# Patient Record
Sex: MALE | Race: WHITE | NOT HISPANIC OR LATINO | Employment: OTHER | RURAL
[De-identification: names, ages, dates, MRNs, and addresses within clinical notes are randomized per-mention and may not be internally consistent; named-entity substitution may affect disease eponyms.]

---

## 2023-03-10 ENCOUNTER — OFFICE VISIT (OUTPATIENT)
Dept: FAMILY MEDICINE CLINIC | Facility: CLINIC | Age: 53
End: 2023-03-10
Payer: MEDICARE

## 2023-03-10 VITALS
HEART RATE: 104 BPM | TEMPERATURE: 98.2 F | OXYGEN SATURATION: 96 % | BODY MASS INDEX: 40.81 KG/M2 | HEIGHT: 67 IN | SYSTOLIC BLOOD PRESSURE: 162 MMHG | DIASTOLIC BLOOD PRESSURE: 104 MMHG | WEIGHT: 260 LBS

## 2023-03-10 DIAGNOSIS — G47.30 SLEEP APNEA, UNSPECIFIED TYPE: ICD-10-CM

## 2023-03-10 DIAGNOSIS — Z13.220 SCREENING FOR HYPERLIPIDEMIA: ICD-10-CM

## 2023-03-10 DIAGNOSIS — R10.11 RIGHT UPPER QUADRANT ABDOMINAL PAIN: ICD-10-CM

## 2023-03-10 DIAGNOSIS — F19.10 POLYSUBSTANCE ABUSE: ICD-10-CM

## 2023-03-10 DIAGNOSIS — E66.01 MORBID (SEVERE) OBESITY DUE TO EXCESS CALORIES: ICD-10-CM

## 2023-03-10 DIAGNOSIS — I10 HYPERTENSION, UNSPECIFIED TYPE: Primary | ICD-10-CM

## 2023-03-10 DIAGNOSIS — B19.20 HEPATITIS C VIRUS INFECTION WITHOUT HEPATIC COMA, UNSPECIFIED CHRONICITY: ICD-10-CM

## 2023-03-10 DIAGNOSIS — Z13.1 SCREENING FOR DIABETES MELLITUS (DM): ICD-10-CM

## 2023-03-10 PROBLEM — F11.988: Status: ACTIVE | Noted: 2023-03-10

## 2023-03-10 PROCEDURE — 36415 COLL VENOUS BLD VENIPUNCTURE: CPT | Performed by: INTERNAL MEDICINE

## 2023-03-10 PROCEDURE — 3077F SYST BP >= 140 MM HG: CPT | Performed by: INTERNAL MEDICINE

## 2023-03-10 PROCEDURE — 3080F DIAST BP >= 90 MM HG: CPT | Performed by: INTERNAL MEDICINE

## 2023-03-10 PROCEDURE — 99204 OFFICE O/P NEW MOD 45 MIN: CPT | Performed by: INTERNAL MEDICINE

## 2023-03-10 RX ORDER — LISINOPRIL 20 MG/1
20 TABLET ORAL DAILY
Qty: 30 TABLET | Refills: 5 | Status: SHIPPED | OUTPATIENT
Start: 2023-03-10

## 2023-03-10 NOTE — PROGRESS NOTES
Office Note     Name: Dev Ascencio    : 1970     MRN: [id number]     Chief Complaint  Establish Care (Pt is here to establish care today. ), Abdominal Pain (Pt was in the [place] ER for abdominal pain about a week ago. C/o right upper quad pain. ), Shortness of Breath (C/o SOB. ), and Hepatitis C (Pt would like to discuss treatment options for Hep C today. )    Subjective     History of Present Illness:  Dev Ascencio is a 52 y.o. male who presents today for multiple concerns    Patient had a recent ER visit, was having right upper quadrant pain, very severe, had recently been doing lifting,  No diarrhea or constipation, Did CT and labs and was diagnosed with Diverticulitis.  They started cipro and flagyl.  Still hurts to take a deep breath but is feeling some better.     Since that time has also had chest pain which is new.      Girlfriend concerned about him stopping breathing in his sleep and would like sleep apnea testing.    Also has dx of Hep C, was diagnosed in the , but tested positive again with the needle exchange program screenings. Has never undergone treatment.  Would  Like to get set up for Hep C Treatment.    Has history of heroine use.  Is not using as often because he ends up up wit h an overdose/ turning blue and requiring narcan. States he not longer uses IV drugs but is not able to specify his date of last use. He does state that he has access to narcan at home.  Is not currently interested in suboxone.          Review of Systems:   Review of Systems   Respiratory: Positive for shortness of breath.    Cardiovascular: Positive for leg swelling.       Past Medical History:   Past Medical History:   Diagnosis Date   • Arthritis    • Bone injury     BONE OR JOINT INJURIES   • Heart valve disorder    • Hepatitis C    • Hypertension    • Opioid-related disorder (HCC)    • Pulmonary embolism (HCC)     Patient states this is inaccurate       Past Surgical History:   Past  "Surgical History:   Procedure Laterality Date   • HIP SURGERY Left     X2   • JOINT REPLACEMENT     • ORTHOPEDIC SURGERY         Family History:   Family History   Problem Relation Age of Onset   • Diabetes Mother    • Allergies Mother    • Obesity Mother    • Other Mother         BLOOD DISEASE   • Eczema Mother    • Breast cancer Mother    • Alcohol abuse Father    • Stomach cancer Father    • Eczema Other    • Allergies Other    • Diabetes Other    • Obesity Other        Social History:   Social History     Socioeconomic History   • Marital status: Single   Tobacco Use   • Smoking status: Every Day     Packs/day: 1.00     Years: 40.00     Pack years: 40.00     Types: Cigarettes     Start date: 1983     Passive exposure: Current   • Smokeless tobacco: Never   Vaping Use   • Vaping Use: Every day   • Substances: Nicotine, Flavoring   Substance and Sexual Activity   • Alcohol use: Not Currently   • Drug use: Defer     Types: Heroin   • Sexual activity: Defer       Immunizations:   There is no immunization history on file for this patient.     Medications:   No current outpatient medications on file.    Allergies:   No Known Allergies    Objective     Vital Signs  BP (!) 162/104   Pulse 104   Temp 98.2 °F (36.8 °C)   Ht 170.2 cm (67\")   Wt 118 kg (260 lb)   SpO2 96%   BMI 40.72 kg/m²   Estimated body mass index is 40.72 kg/m² as calculated from the following:    Height as of this encounter: 170.2 cm (67\").    Weight as of this encounter: 118 kg (260 lb).    Class 3 Severe Obesity (BMI >=40). Obesity-related health conditions include the following: hypertension and GERD. Obesity is worsening. BMI is is above average; no BMI management plan is appropriate. We discussed Will defer to liver eval is complete.      Physical Exam  Vitals and nursing note reviewed.   Constitutional:       Appearance: Normal appearance.   Cardiovascular:      Rate and Rhythm: Normal rate and regular rhythm.      Heart sounds: No murmur " heard.    No friction rub. No gallop.   Pulmonary:      Effort: Pulmonary effort is normal.      Breath sounds: Normal breath sounds. No wheezing, rhonchi or rales.   Abdominal:      General: There is distension (rotund abdomen, difficult to palpate, no obvious mass or rebound tenderness).      Palpations: Abdomen is soft.      Tenderness: There is no abdominal tenderness. There is no guarding.   Neurological:      Mental Status: He is alert.            Procedures     Assessment and Plan     1. Hypertension, unspecified type  - Comprehensive Metabolic Panel; Future  - Comprehensive Metabolic Panel  - lisinopril (PRINIVIL,ZESTRIL) 20 MG tablet; Take 1 tablet by mouth Daily.  Dispense: 30 tablet; Refill: 5    2. Hepatitis C virus infection without hepatic coma, unspecified chronicity  If labs confirm positive hep C will refer to GI  - US Abdomen Complete; Future  - Comprehensive Metabolic Panel  - Hepatitis C Antibody  - Hepatitis C Virus (HCV) RNA, Diagnosis    3. Sleep apnea, unspecified type  - Comprehensive Metabolic Panel; Future  - Comprehensive Metabolic Panel    4. Right upper quadrant abdominal pain  - CBC Auto Differential; Future  - Comprehensive Metabolic Panel; Future  - US Abdomen Complete; Future  - CBC Auto Differential  - Comprehensive Metabolic Panel    5. Screening for diabetes mellitus (DM)  - Comprehensive Metabolic Panel; Future  - Comprehensive Metabolic Panel    6. Screening for hyperlipidemia  - Lipid Panel; Future  - Lipid Panel    7. Morbid (severe) obesity due to excess calories (HCC)  Will defer weight loss plan til liver/GI workup complete    8.Polysubstance abuse   Declines recommendation for suboxone clinic    Follow Up  Return in about 4 weeks (around 4/7/2023) for Followup with Dr Wilkins- IN PERSON.    Maddi Wilkins MD  [facility]  [address] [place] [id number] [phone]

## 2023-03-11 LAB
ALBUMIN SERPL-MCNC: 3.9 G/DL (ref 3.8–4.9)
ALBUMIN/GLOB SERPL: 1.1 {RATIO} (ref 1.2–2.2)
ALP SERPL-CCNC: 107 IU/L (ref 44–121)
ALT SERPL-CCNC: 17 IU/L (ref 0–44)
AST SERPL-CCNC: 19 IU/L (ref 0–40)
BASOPHILS # BLD AUTO: 0 X10E3/UL (ref 0–0.2)
BASOPHILS NFR BLD AUTO: 0 %
BILIRUB SERPL-MCNC: 0.3 MG/DL (ref 0–1.2)
BUN SERPL-MCNC: 15 MG/DL (ref 6–24)
BUN/CREAT SERPL: 14 (ref 9–20)
CALCIUM SERPL-MCNC: 9.1 MG/DL (ref 8.7–10.2)
CHLORIDE SERPL-SCNC: 102 MMOL/L (ref 96–106)
CHOLEST SERPL-MCNC: 162 MG/DL (ref 100–199)
CO2 SERPL-SCNC: 21 MMOL/L (ref 20–29)
CREAT SERPL-MCNC: 1.09 MG/DL (ref 0.76–1.27)
EGFRCR SERPLBLD CKD-EPI 2021: 81 ML/MIN/1.73
EOSINOPHIL # BLD AUTO: 0.2 X10E3/UL (ref 0–0.4)
EOSINOPHIL NFR BLD AUTO: 2 %
ERYTHROCYTE [DISTWIDTH] IN BLOOD BY AUTOMATED COUNT: 12.5 % (ref 11.6–15.4)
GLOBULIN SER CALC-MCNC: 3.5 G/DL (ref 1.5–4.5)
GLUCOSE SERPL-MCNC: 188 MG/DL (ref 70–99)
HCT VFR BLD AUTO: 44.7 % (ref 37.5–51)
HCV IGG SERPL QL IA: REACTIVE
HCV RNA SERPL NAA+PROBE-ACNC: NORMAL IU/ML
HCV RNA SERPL NAA+PROBE-LOG IU: 5.96 LOG10 IU/ML
HDLC SERPL-MCNC: 59 MG/DL
HGB BLD-MCNC: 15 G/DL (ref 13–17.7)
IMM GRANULOCYTES # BLD AUTO: 0.1 X10E3/UL (ref 0–0.1)
IMM GRANULOCYTES NFR BLD AUTO: 1 %
LDLC SERPL CALC-MCNC: 83 MG/DL (ref 0–99)
LYMPHOCYTES # BLD AUTO: 2.3 X10E3/UL (ref 0.7–3.1)
LYMPHOCYTES NFR BLD AUTO: 24 %
MCH RBC QN AUTO: 28.6 PG (ref 26.6–33)
MCHC RBC AUTO-ENTMCNC: 33.6 G/DL (ref 31.5–35.7)
MCV RBC AUTO: 85 FL (ref 79–97)
MONOCYTES # BLD AUTO: 0.5 X10E3/UL (ref 0.1–0.9)
MONOCYTES NFR BLD AUTO: 5 %
NEUTROPHILS # BLD AUTO: 6.3 X10E3/UL (ref 1.4–7)
NEUTROPHILS NFR BLD AUTO: 68 %
PLATELET # BLD AUTO: 323 X10E3/UL (ref 150–450)
POTASSIUM SERPL-SCNC: 4.6 MMOL/L (ref 3.5–5.2)
PROT SERPL-MCNC: 7.4 G/DL (ref 6–8.5)
RBC # BLD AUTO: 5.24 X10E6/UL (ref 4.14–5.8)
SODIUM SERPL-SCNC: 137 MMOL/L (ref 134–144)
TEST INFORMATION: NORMAL
TRIGL SERPL-MCNC: 109 MG/DL (ref 0–149)
VLDLC SERPL CALC-MCNC: 20 MG/DL (ref 5–40)
WBC # BLD AUTO: 9.4 X10E3/UL (ref 3.4–10.8)

## 2023-03-13 DIAGNOSIS — B19.20 HEPATITIS C VIRUS INFECTION WITHOUT HEPATIC COMA, UNSPECIFIED CHRONICITY: Primary | ICD-10-CM

## 2023-04-14 ENCOUNTER — HOSPITAL ENCOUNTER (OUTPATIENT)
Dept: ULTRASOUND IMAGING | Facility: HOSPITAL | Age: 53
Discharge: HOME | End: 2023-04-14
Admitting: INTERNAL MEDICINE
Payer: MEDICARE

## 2023-04-14 DIAGNOSIS — B19.20 HEPATITIS C VIRUS INFECTION WITHOUT HEPATIC COMA, UNSPECIFIED CHRONICITY: ICD-10-CM

## 2023-04-14 DIAGNOSIS — R10.11 RIGHT UPPER QUADRANT ABDOMINAL PAIN: ICD-10-CM

## 2023-04-14 PROCEDURE — 76700 US EXAM ABDOM COMPLETE: CPT

## 2023-04-17 ENCOUNTER — OFFICE VISIT (OUTPATIENT)
Dept: FAMILY MEDICINE CLINIC | Facility: CLINIC | Age: 53
End: 2023-04-17
Payer: MEDICARE

## 2023-04-17 VITALS
HEART RATE: 117 BPM | SYSTOLIC BLOOD PRESSURE: 144 MMHG | OXYGEN SATURATION: 96 % | DIASTOLIC BLOOD PRESSURE: 94 MMHG | TEMPERATURE: 98.4 F | BODY MASS INDEX: 36.99 KG/M2 | WEIGHT: 235.7 LBS | HEIGHT: 67 IN

## 2023-04-17 DIAGNOSIS — R73.9 ELEVATED BLOOD SUGAR: ICD-10-CM

## 2023-04-17 DIAGNOSIS — R63.4 WEIGHT LOSS: ICD-10-CM

## 2023-04-17 DIAGNOSIS — E11.65 TYPE 2 DIABETES MELLITUS WITH HYPERGLYCEMIA, WITHOUT LONG-TERM CURRENT USE OF INSULIN: Primary | ICD-10-CM

## 2023-04-17 DIAGNOSIS — R11.2 NAUSEA AND VOMITING, UNSPECIFIED VOMITING TYPE: ICD-10-CM

## 2023-04-17 LAB
EXPIRATION DATE: NORMAL
HBA1C MFR BLD: 14.1 %
Lab: NORMAL

## 2023-04-17 PROCEDURE — 36415 COLL VENOUS BLD VENIPUNCTURE: CPT | Performed by: INTERNAL MEDICINE

## 2023-04-17 RX ORDER — ONDANSETRON HYDROCHLORIDE 8 MG/1
8 TABLET, FILM COATED ORAL EVERY 8 HOURS PRN
Qty: 21 TABLET | Refills: 0 | Status: SHIPPED | OUTPATIENT
Start: 2023-04-17

## 2023-04-17 NOTE — PROGRESS NOTES
Office Note     Name: Dev Ascencio    : 1970     MRN: [id number]     Chief Complaint  Follow-up (4 week FU), Vomiting (Vommitting, diarrhea- started 4-5 days go (throws up everything he eat)/Insatiable thirst- since before the last appointment /Lightheaded and thirsty all the time/Then throws up all the water/Shortness of breath), and Weight Loss (Lost 25 lbs in the last month)    Subjective     History of Present Illness:  Dev Ascencio is a 52 y.o. male who presents today for persistent nausea, weight loss    patient has been feeling very thirsty, has had upset stomach and poor appetite, feeling more weak and tired. Has been vomting more and this week has started to have loose stool again.    Is more weak, dizzy and SOA      Review of Systems:   Review of Systems    Past Medical History:   Past Medical History:   Diagnosis Date   • Arthritis    • Bone injury     BONE OR JOINT INJURIES   • Heart valve disorder    • Hepatitis C    • Hypertension    • Opioid-related disorder    • Pulmonary embolism     Patient states this is inaccurate       Past Surgical History:   Past Surgical History:   Procedure Laterality Date   • HIP SURGERY Left     X2   • JOINT REPLACEMENT     • ORTHOPEDIC SURGERY         Family History:   Family History   Problem Relation Age of Onset   • Diabetes Mother    • Allergies Mother    • Obesity Mother    • Other Mother         BLOOD DISEASE   • Eczema Mother    • Breast cancer Mother    • Alcohol abuse Father    • Stomach cancer Father    • Eczema Other    • Allergies Other    • Diabetes Other    • Obesity Other        Social History:   Social History     Socioeconomic History   • Marital status: Significant Other   Tobacco Use   • Smoking status: Every Day     Packs/day: 0.25     Years: 40.00     Pack years: 10.00     Types: Cigarettes     Start date:      Passive exposure: Current   • Smokeless tobacco: Never   Vaping Use   • Vaping Use: Every day   • Substances:  "Nicotine, Flavoring   Substance and Sexual Activity   • Alcohol use: Not Currently   • Drug use: Defer     Types: Heroin   • Sexual activity: Defer       Immunizations:   There is no immunization history on file for this patient.     Medications:     Current Outpatient Medications:   •  lisinopril (PRINIVIL,ZESTRIL) 20 MG tablet, Take 1 tablet by mouth Daily., Disp: 30 tablet, Rfl: 5  •  metFORMIN (Glucophage) 500 MG tablet, Take 1 tablet by mouth 2 (Two) Times a Day With Meals., Disp: 60 tablet, Rfl: 1  •  ondansetron (Zofran) 8 MG tablet, Take 1 tablet by mouth Every 8 (Eight) Hours As Needed for Nausea or Vomiting., Disp: 21 tablet, Rfl: 0    Allergies:   No Known Allergies    Objective     Vital Signs  /94 (BP Location: Left arm)   Pulse 117   Temp 98.4 °F (36.9 °C) (Oral)   Ht 170.2 cm (67\")   Wt 107 kg (235 lb 11.2 oz)   SpO2 96%   BMI 36.92 kg/m²   Estimated body mass index is 36.92 kg/m² as calculated from the following:    Height as of this encounter: 170.2 cm (67\").    Weight as of this encounter: 107 kg (235 lb 11.2 oz).          Physical Exam  Vitals and nursing note reviewed.   Constitutional:       Appearance: Normal appearance.   Cardiovascular:      Rate and Rhythm: Normal rate and regular rhythm.      Heart sounds: No murmur heard.    No friction rub. No gallop.   Pulmonary:      Effort: Pulmonary effort is normal.      Breath sounds: Normal breath sounds. No wheezing, rhonchi or rales.   Neurological:      Mental Status: He is alert.         Procedures     Reviewed the following data during this visit:  US Abdomen Complete (04/14/2023 16:10)      Assessment and Plan     1. Type 2 diabetes mellitus with hyperglycemia, without long-term current use of insulin:     NEW DIAGOSIS  - POC Glycosylated Hemoglobin (Hb A1C)  - metFORMIN (Glucophage) 500 MG tablet; Take 1 tablet by mouth 2 (Two) Times a Day With Meals.  Dispense: 60 tablet; Refill: 1  Counseled that metformin may worsen naseua and " cause diarreha, if he is unable to tolerate PO medications and fluids he should be re-evaluated in ER    2. Elevated blood sugar    - Comprehensive Metabolic Panel; Future  - CBC Auto Differential; Future  - POC Glucose  - ondansetron (Zofran) 8 MG tablet; Take 1 tablet by mouth Every 8 (Eight) Hours As Needed for Nausea or Vomiting.  Dispense: 21 tablet; Refill: 0  - POC Urinalysis Dipstick, Automated  - POC Glycosylated Hemoglobin (Hb A1C)  - metFORMIN (Glucophage) 500 MG tablet; Take 1 tablet by mouth 2 (Two) Times a Day With Meals.  Dispense: 60 tablet; Refill: 1  - Comprehensive Metabolic Panel  - CBC Auto Differential  - CBC Auto Differential    3. Nausea and vomiting, unspecified vomiting type    - ondansetron (Zofran) 8 MG tablet; Take 1 tablet by mouth Every 8 (Eight) Hours As Needed for Nausea or Vomiting.  Dispense: 21 tablet; Refill: 0  - POC Urinalysis Dipstick, Automated  - POC Glycosylated Hemoglobin (Hb A1C)  - metFORMIN (Glucophage) 500 MG tablet; Take 1 tablet by mouth 2 (Two) Times a Day With Meals.  Dispense: 60 tablet; Refill: 1    4. Weight loss       Reviewed recent US from 4/14/23 with patient and wife in office today.    1. Nonvisualized gallbladder. No history of cholecystectomy is provided. The gallbladder may have been completely contracted at the time of imaging. Common bile duct is within normal limits  2. Hepatic steatosis  3. Normal ultrasound appearance of the kidneys, spleen, and visualized pancreas  4. Normal caliber aorta and patent IVC  5. No ascites        Follow Up  Return in about 2 weeks (around 5/1/2023) for Followup with Dr Wilkins- IN PERSON.    Patient was given instructions and counseling regarding his condition or for health maintenance advice. Please see specific information pulled into the AVS if appropriate.     I spent 32 minutes caring for this patient on this date of service. This time includes time spent by me in the following activities: preparing for the visit,  reviewing tests, obtaining and/or reviewing a separately obtained history, counseling and educating the patient/family/caregiver and documenting information in the medical record.    [facility]  [address] [place] [id number] [phone]  Answers for HPI/ROS submitted by the patient on 4/13/2023  What is the primary reason for your visit?: Abdominal Pain  Chronicity: recurrent  Onset: more than 1 month ago  Onset quality: sudden  Frequency: daily  Progression since onset: waxing and waning  Pain location: LUQ  Pain - numeric: 6/10  Pain quality: cramping  anorexia: No  belching: Yes  flatus: Yes  headaches: No  hematochezia: No  melena: No  Aggravated by: coughing, deep breathing, eating, vomiting  Relieved by: being still, recumbency  Diagnostic workup: ultrasound

## 2023-04-19 LAB
ALBUMIN SERPL-MCNC: 4.6 G/DL (ref 3.8–4.9)
ALBUMIN/GLOB SERPL: 1.2 {RATIO} (ref 1.2–2.2)
ALP SERPL-CCNC: 145 IU/L (ref 44–121)
ALT SERPL-CCNC: 24 IU/L (ref 0–44)
AST SERPL-CCNC: 23 IU/L (ref 0–40)
BASOPHILS # BLD AUTO: 0.1 X10E3/UL (ref 0–0.2)
BASOPHILS NFR BLD AUTO: 1 %
BILIRUB SERPL-MCNC: 1 MG/DL (ref 0–1.2)
BUN SERPL-MCNC: 14 MG/DL (ref 6–24)
BUN/CREAT SERPL: 13 (ref 9–20)
CALCIUM SERPL-MCNC: 9.9 MG/DL (ref 8.7–10.2)
CHLORIDE SERPL-SCNC: 85 MMOL/L (ref 96–106)
CO2 SERPL-SCNC: 21 MMOL/L (ref 20–29)
CREAT SERPL-MCNC: 1.09 MG/DL (ref 0.76–1.27)
EGFRCR SERPLBLD CKD-EPI 2021: 81 ML/MIN/1.73
EOSINOPHIL # BLD AUTO: 0.1 X10E3/UL (ref 0–0.4)
EOSINOPHIL NFR BLD AUTO: 1 %
ERYTHROCYTE [DISTWIDTH] IN BLOOD BY AUTOMATED COUNT: 12.6 % (ref 11.6–15.4)
GLOBULIN SER CALC-MCNC: 3.7 G/DL (ref 1.5–4.5)
GLUCOSE SERPL-MCNC: 563 MG/DL (ref 70–99)
HCT VFR BLD AUTO: 52.3 % (ref 37.5–51)
HGB BLD-MCNC: 17.2 G/DL (ref 13–17.7)
IMM GRANULOCYTES # BLD AUTO: 0 X10E3/UL (ref 0–0.1)
IMM GRANULOCYTES NFR BLD AUTO: 0 %
LYMPHOCYTES # BLD AUTO: 3 X10E3/UL (ref 0.7–3.1)
LYMPHOCYTES NFR BLD AUTO: 28 %
MCH RBC QN AUTO: 28.4 PG (ref 26.6–33)
MCHC RBC AUTO-ENTMCNC: 32.9 G/DL (ref 31.5–35.7)
MCV RBC AUTO: 86 FL (ref 79–97)
MONOCYTES # BLD AUTO: 0.6 X10E3/UL (ref 0.1–0.9)
MONOCYTES NFR BLD AUTO: 6 %
NEUTROPHILS # BLD AUTO: 6.8 X10E3/UL (ref 1.4–7)
NEUTROPHILS NFR BLD AUTO: 64 %
PLATELET # BLD AUTO: 339 X10E3/UL (ref 150–450)
POTASSIUM SERPL-SCNC: 7.1 MMOL/L (ref 3.5–5.2)
PROT SERPL-MCNC: 8.3 G/DL (ref 6–8.5)
RBC # BLD AUTO: 6.05 X10E6/UL (ref 4.14–5.8)
SODIUM SERPL-SCNC: 129 MMOL/L (ref 134–144)
WBC # BLD AUTO: 10.6 X10E3/UL (ref 3.4–10.8)

## 2023-04-21 ENCOUNTER — OFFICE VISIT (OUTPATIENT)
Dept: FAMILY MEDICINE CLINIC | Facility: CLINIC | Age: 53
End: 2023-04-21
Payer: MEDICARE

## 2023-04-21 VITALS
SYSTOLIC BLOOD PRESSURE: 138 MMHG | BODY MASS INDEX: 37.9 KG/M2 | OXYGEN SATURATION: 95 % | WEIGHT: 241.5 LBS | HEART RATE: 105 BPM | DIASTOLIC BLOOD PRESSURE: 92 MMHG | HEIGHT: 67 IN

## 2023-04-21 DIAGNOSIS — Z79.4 TYPE 2 DIABETES MELLITUS WITH HYPERGLYCEMIA, WITH LONG-TERM CURRENT USE OF INSULIN: Primary | ICD-10-CM

## 2023-04-21 DIAGNOSIS — E11.65 TYPE 2 DIABETES MELLITUS WITH HYPERGLYCEMIA, WITH LONG-TERM CURRENT USE OF INSULIN: Primary | ICD-10-CM

## 2023-04-21 LAB — GLUCOSE BLDC GLUCOMTR-MCNC: 326 MG/DL (ref 70–130)

## 2023-04-21 RX ORDER — PEN NEEDLE, DIABETIC 30 GX5/16"
NEEDLE, DISPOSABLE MISCELLANEOUS
Qty: 30 EACH | Refills: 11 | Status: SHIPPED | OUTPATIENT
Start: 2023-04-21

## 2023-04-21 RX ORDER — METOCLOPRAMIDE 10 MG/1
10 TABLET ORAL 3 TIMES WEEKLY
COMMUNITY
Start: 2023-04-19

## 2023-04-21 NOTE — PROGRESS NOTES
Office Note     Name: Dev Ascencio    : 1970     MRN: [id number]     Chief Complaint  Diabetes and Hospital Follow Up Visit (Pt is here for a HFU today. )    Subjective     History of Present Illness:  Dev Ascencio is a 52 y.o. male who presents today for hospital followup.  He was a new diagnosis of diabetes at last visit, his glucose had gone up significantly and his A1C at that time was elevated, We'd attempted to start him on metformin but he had persistent nausea/vomiting so he went to ER. They gave him IVF and a one time dose of insulin and sent him home.    Today he feels some better but still very fatigued and nauseated.    Review of Systems:   Review of Systems    Past Medical History:   Past Medical History:   Diagnosis Date   • Arthritis    • Bone injury     BONE OR JOINT INJURIES   • Heart valve disorder    • Hepatitis C    • Hypertension    • Opioid-related disorder    • Pulmonary embolism     Patient states this is inaccurate       Past Surgical History:   Past Surgical History:   Procedure Laterality Date   • HIP SURGERY Left     X2   • JOINT REPLACEMENT     • ORTHOPEDIC SURGERY         Family History:   Family History   Problem Relation Age of Onset   • Diabetes Mother    • Allergies Mother    • Obesity Mother    • Other Mother         BLOOD DISEASE   • Eczema Mother    • Breast cancer Mother    • Alcohol abuse Father    • Stomach cancer Father    • Eczema Other    • Allergies Other    • Diabetes Other    • Obesity Other        Social History:   Social History     Socioeconomic History   • Marital status: Significant Other   Tobacco Use   • Smoking status: Every Day     Packs/day: 0.25     Years: 40.00     Pack years: 10.00     Types: Cigarettes     Start date:      Passive exposure: Current   • Smokeless tobacco: Never   Vaping Use   • Vaping Use: Every day   • Substances: Nicotine, Flavoring   Substance and Sexual Activity   • Alcohol use: Not Currently   • Drug use:  "Defer     Types: Heroin   • Sexual activity: Defer       Immunizations:   There is no immunization history on file for this patient.     Medications:     Current Outpatient Medications:   •  lisinopril (PRINIVIL,ZESTRIL) 20 MG tablet, Take 1 tablet by mouth Daily., Disp: 30 tablet, Rfl: 5  •  metFORMIN (Glucophage) 500 MG tablet, Take 1 tablet by mouth 2 (Two) Times a Day With Meals., Disp: 60 tablet, Rfl: 1  •  metoclopramide (REGLAN) 10 MG tablet, Take 1 tablet by mouth 3 (Three) Times a Week., Disp: , Rfl:   •  ondansetron (Zofran) 8 MG tablet, Take 1 tablet by mouth Every 8 (Eight) Hours As Needed for Nausea or Vomiting., Disp: 21 tablet, Rfl: 0  •  Insulin Glargine (LANTUS SOLOSTAR) 100 UNIT/ML injection pen, Inject 10 Units under the skin into the appropriate area as directed Every Night., Disp: 3 mL, Rfl: 1  •  Insulin Pen Needle (Pen Needles 3/16\") 31G X 5 MM misc, Use as directed with insulin Pen, Disp: 30 each, Rfl: 11    Allergies:   No Known Allergies    Objective     Vital Signs  /92   Pulse 105   Ht 170.2 cm (67\")   Wt 110 kg (241 lb 8 oz)   SpO2 95%   BMI 37.82 kg/m²   Estimated body mass index is 37.82 kg/m² as calculated from the following:    Height as of this encounter: 170.2 cm (67\").    Weight as of this encounter: 110 kg (241 lb 8 oz).          Physical Exam  Vitals and nursing note reviewed.   Constitutional:       Appearance: Normal appearance.   Cardiovascular:      Rate and Rhythm: Normal rate and regular rhythm.      Heart sounds: No murmur heard.    No friction rub. No gallop.   Pulmonary:      Effort: Pulmonary effort is normal.      Breath sounds: Normal breath sounds. No wheezing, rhonchi or rales.   Neurological:      Mental Status: He is alert.            Procedures     Assessment and Plan     1. Type 2 diabetes mellitus with hyperglycemia, with long-term current use of insulin    - POC Glucose  - Insulin Glargine (LANTUS SOLOSTAR) 100 UNIT/ML injection pen; Inject 10 " "Units under the skin into the appropriate area as directed Every Night.  Dispense: 3 mL; Refill: 1  - Comprehensive Metabolic Panel; Future  - Magnesium; Future  - Miscellaneous DME  - Insulin Pen Needle (Pen Needles 3/16\") 31G X 5 MM misc; Use as directed with insulin Pen  Dispense: 30 each; Refill: 11       Follow Up  No follow-ups on file.    Patient was given instructions and counseling regarding his condition or for health maintenance advice. Please see specific information pulled into the AVS if appropriate.     Maddi Wilkins MD  [facility]  [address] [place] [id number] [phone]  "

## 2023-05-03 ENCOUNTER — OFFICE VISIT (OUTPATIENT)
Dept: FAMILY MEDICINE CLINIC | Facility: CLINIC | Age: 53
End: 2023-05-03
Payer: MEDICARE

## 2023-05-03 ENCOUNTER — TELEPHONE (OUTPATIENT)
Dept: FAMILY MEDICINE CLINIC | Facility: CLINIC | Age: 53
End: 2023-05-03

## 2023-05-03 VITALS
HEIGHT: 67 IN | SYSTOLIC BLOOD PRESSURE: 160 MMHG | HEART RATE: 121 BPM | DIASTOLIC BLOOD PRESSURE: 98 MMHG | WEIGHT: 239 LBS | OXYGEN SATURATION: 93 % | BODY MASS INDEX: 37.51 KG/M2

## 2023-05-03 DIAGNOSIS — E11.65 TYPE 2 DIABETES MELLITUS WITH HYPERGLYCEMIA, WITH LONG-TERM CURRENT USE OF INSULIN: ICD-10-CM

## 2023-05-03 DIAGNOSIS — R73.9 ELEVATED BLOOD SUGAR: ICD-10-CM

## 2023-05-03 DIAGNOSIS — R11.2 NAUSEA AND VOMITING, UNSPECIFIED VOMITING TYPE: ICD-10-CM

## 2023-05-03 DIAGNOSIS — Z79.4 TYPE 2 DIABETES MELLITUS WITH HYPERGLYCEMIA, WITH LONG-TERM CURRENT USE OF INSULIN: ICD-10-CM

## 2023-05-03 DIAGNOSIS — M62.838 MUSCLE SPASM: Primary | ICD-10-CM

## 2023-05-03 DIAGNOSIS — E11.65 TYPE 2 DIABETES MELLITUS WITH HYPERGLYCEMIA, WITHOUT LONG-TERM CURRENT USE OF INSULIN: ICD-10-CM

## 2023-05-03 PROCEDURE — 3080F DIAST BP >= 90 MM HG: CPT | Performed by: INTERNAL MEDICINE

## 2023-05-03 PROCEDURE — 1159F MED LIST DOCD IN RCRD: CPT | Performed by: INTERNAL MEDICINE

## 2023-05-03 PROCEDURE — 1160F RVW MEDS BY RX/DR IN RCRD: CPT | Performed by: INTERNAL MEDICINE

## 2023-05-03 PROCEDURE — 3077F SYST BP >= 140 MM HG: CPT | Performed by: INTERNAL MEDICINE

## 2023-05-03 PROCEDURE — 3046F HEMOGLOBIN A1C LEVEL >9.0%: CPT | Performed by: INTERNAL MEDICINE

## 2023-05-03 PROCEDURE — 99213 OFFICE O/P EST LOW 20 MIN: CPT | Performed by: INTERNAL MEDICINE

## 2023-05-03 NOTE — PROGRESS NOTES
Office Note     Name: Dev Ascencio    : 1970     MRN: [id number]     Chief Complaint  Diabetes (Pt is here for a follow up on dm. )    Subjective     History of Present Illness:  Dev Ascencio is a 52 y.o. male who presents today for followup on DM    DM: Fasting glucoses still >300. Taking 1 metformin per day usually in the morning, can't take 2 due to GI issues and schedule not allowing him to take the evening dose in the evening, and usually takes 10 units of insulin before bed. Is not interested in changing metformin to a longer acting formulation    States he usually never misses doses but [place] and forgot he had the appointment til this morning.    Has not been checking his sugars as regularly as he'd like  because pharmacy told him his insurance wouldn't cover the glucometer but would not tell him why. Has been using mom's glucometer    Has also been having severe  Muscle spasms in legs.    Review of Systems:   Review of Systems    Past Medical History:   Past Medical History:   Diagnosis Date   • Arthritis    • Bone injury     BONE OR JOINT INJURIES   • Heart valve disorder    • Hepatitis C    • Hypertension    • Opioid-related disorder    • Pulmonary embolism     Patient states this is inaccurate       Past Surgical History:   Past Surgical History:   Procedure Laterality Date   • HIP SURGERY Left     X2   • JOINT REPLACEMENT     • ORTHOPEDIC SURGERY         Family History:   Family History   Problem Relation Age of Onset   • Diabetes Mother    • Allergies Mother    • Obesity Mother    • Other Mother         BLOOD DISEASE   • Eczema Mother    • Breast cancer Mother    • Alcohol abuse Father    • Stomach cancer Father    • Eczema Other    • Allergies Other    • Diabetes Other    • Obesity Other        Social History:   Social History     Socioeconomic History   • Marital status: Significant Other   Tobacco Use   • Smoking status: Every Day      "Packs/day: 0.25     Years: 40.00     Pack years: 10.00     Types: Cigarettes     Start date: 1983     Passive exposure: Current   • Smokeless tobacco: Never   Vaping Use   • Vaping Use: Every day   • Substances: Nicotine, Flavoring   Substance and Sexual Activity   • Alcohol use: Not Currently   • Drug use: Defer     Types: Heroin   • Sexual activity: Defer       Immunizations:   There is no immunization history on file for this patient.     Medications:     Current Outpatient Medications:   •  Insulin Glargine (LANTUS SOLOSTAR) 100 UNIT/ML injection pen, Inject 10 Units under the skin into the appropriate area as directed Every Night., Disp: 3 mL, Rfl: 1  •  Insulin Pen Needle (Pen Needles 3/16\") 31G X 5 MM misc, Use as directed with insulin Pen, Disp: 30 each, Rfl: 11  •  lisinopril (PRINIVIL,ZESTRIL) 20 MG tablet, Take 1 tablet by mouth Daily., Disp: 30 tablet, Rfl: 5  •  metFORMIN (Glucophage) 500 MG tablet, Take 1 tablet by mouth 2 (Two) Times a Day With Meals., Disp: 60 tablet, Rfl: 1  •  metoclopramide (REGLAN) 10 MG tablet, Take 1 tablet by mouth 3 (Three) Times a Week., Disp: , Rfl:   •  ondansetron (Zofran) 8 MG tablet, Take 1 tablet by mouth Every 8 (Eight) Hours As Needed for Nausea or Vomiting., Disp: 21 tablet, Rfl: 0    Allergies:   No Known Allergies    Objective     Vital Signs  /98   Pulse (!) 121   Ht 170.2 cm (67\")   Wt 108 kg (239 lb)   SpO2 93%   BMI 37.43 kg/m²   Estimated body mass index is 37.43 kg/m² as calculated from the following:    Height as of this encounter: 170.2 cm (67\").    Weight as of this encounter: 108 kg (239 lb).          Physical Exam  Vitals and nursing note reviewed.   Constitutional:       Appearance: Normal appearance.   Cardiovascular:      Rate and Rhythm: Normal rate and regular rhythm.      Heart sounds: No murmur heard.    No friction rub. No gallop.   Pulmonary:      Effort: Pulmonary effort is normal.      Breath sounds: Normal breath sounds. No " wheezing, rhonchi or rales.   Neurological:      Mental Status: He is alert.            Procedures     Assessment and Plan     1. Type 2 diabetes mellitus with hyperglycemia, with long-term current use of insulin  INCREASE Lantus to 15 units, for 2 weeks, then if sugar is no better go up to 20 units at bedtime.  - Insulin Glargine (LANTUS SOLOSTAR) 100 UNIT/ML injection pen; Inject 20 Units under the skin into the appropriate area as directed Every Night.  Dispense: 15 mL; Refill: 1    2. Elevated blood sugar  - metFORMIN (Glucophage) 500 MG tablet; Take 1 tablet by mouth 2 (Two) Times a Day With Meals.  Dispense: 60 tablet; Refill: 1    3. Nausea and vomiting, unspecified vomiting type  - metFORMIN (Glucophage) 500 MG tablet; Take 1 tablet by mouth 2 (Two) Times a Day With Meals.  Dispense: 60 tablet; Refill: 1    4. Type 2 diabetes mellitus with hyperglycemia, without long-term current use of insulin  - metFORMIN (Glucophage) 500 MG tablet; Take 1 tablet by mouth 2 (Two) Times a Day With Meals.  Dispense: 60 tablet; Refill: 1    5. Muscle spasm    - Basic Metabolic Panel  - Magnesium  - CK       Follow Up  Return in about 2 months (around 7/3/2023) for Followup with Dr Wilkins- IN PERSON.    Patient was given instructions and counseling regarding his condition or for health maintenance advice. Please see specific information pulled into the AVS if appropriate.     Maddi Wilkins MD  [facility]  [address] [place] [id number] [phone]

## 2023-05-04 LAB
BUN SERPL-MCNC: 15 MG/DL (ref 6–24)
BUN/CREAT SERPL: 11 (ref 9–20)
CALCIUM SERPL-MCNC: 9.7 MG/DL (ref 8.7–10.2)
CHLORIDE SERPL-SCNC: 93 MMOL/L (ref 96–106)
CK SERPL-CCNC: 47 U/L (ref 41–331)
CO2 SERPL-SCNC: 17 MMOL/L (ref 20–29)
CREAT SERPL-MCNC: 1.33 MG/DL (ref 0.76–1.27)
EGFRCR SERPLBLD CKD-EPI 2021: 64 ML/MIN/1.73
GLUCOSE SERPL-MCNC: 803 MG/DL (ref 70–99)
POTASSIUM SERPL-SCNC: 4.6 MMOL/L (ref 3.5–5.2)
SODIUM SERPL-SCNC: 130 MMOL/L (ref 134–144)

## 2023-08-24 DIAGNOSIS — R11.2 NAUSEA AND VOMITING, UNSPECIFIED VOMITING TYPE: ICD-10-CM

## 2023-08-24 DIAGNOSIS — R73.9 ELEVATED BLOOD SUGAR: ICD-10-CM

## 2023-08-28 RX ORDER — BLOOD-GLUCOSE METER
EACH MISCELLANEOUS
Qty: 1 EACH | Refills: 1 | Status: SHIPPED | OUTPATIENT
Start: 2023-08-28

## 2023-08-28 RX ORDER — ONDANSETRON HYDROCHLORIDE 8 MG/1
TABLET, FILM COATED ORAL
Qty: 21 TABLET | Refills: 0 | Status: SHIPPED | OUTPATIENT
Start: 2023-08-28

## 2023-09-28 ENCOUNTER — EXTERNAL PBMM DATA (OUTPATIENT)
Dept: PHARMACY | Facility: OTHER | Age: 53
End: 2023-09-28
Payer: MEDICARE

## 2023-10-11 DIAGNOSIS — E11.65 TYPE 2 DIABETES MELLITUS WITH HYPERGLYCEMIA, WITH LONG-TERM CURRENT USE OF INSULIN: ICD-10-CM

## 2023-10-11 DIAGNOSIS — I10 HYPERTENSION, UNSPECIFIED TYPE: ICD-10-CM

## 2023-10-11 DIAGNOSIS — Z79.4 TYPE 2 DIABETES MELLITUS WITH HYPERGLYCEMIA, WITH LONG-TERM CURRENT USE OF INSULIN: ICD-10-CM

## 2023-10-17 RX ORDER — LISINOPRIL 20 MG/1
20 TABLET ORAL DAILY
Qty: 90 TABLET | Refills: 0 | Status: SHIPPED | OUTPATIENT
Start: 2023-10-17

## 2023-10-17 RX ORDER — INSULIN GLARGINE 100 [IU]/ML
INJECTION, SOLUTION SUBCUTANEOUS
Qty: 15 ML | Refills: 1 | Status: SHIPPED | OUTPATIENT
Start: 2023-10-17

## 2023-10-24 ENCOUNTER — OFFICE VISIT (OUTPATIENT)
Dept: FAMILY MEDICINE CLINIC | Facility: CLINIC | Age: 53
End: 2023-10-24
Payer: MEDICARE

## 2023-10-24 VITALS
OXYGEN SATURATION: 96 % | SYSTOLIC BLOOD PRESSURE: 184 MMHG | DIASTOLIC BLOOD PRESSURE: 110 MMHG | HEART RATE: 94 BPM | BODY MASS INDEX: 40.39 KG/M2 | WEIGHT: 257.9 LBS

## 2023-10-24 DIAGNOSIS — I10 HYPERTENSION, UNSPECIFIED TYPE: ICD-10-CM

## 2023-10-24 DIAGNOSIS — L30.9 DERMATITIS: ICD-10-CM

## 2023-10-24 DIAGNOSIS — Z00.00 MEDICARE ANNUAL WELLNESS VISIT, SUBSEQUENT: Primary | ICD-10-CM

## 2023-10-24 DIAGNOSIS — R60.0 LOWER EXTREMITY EDEMA: ICD-10-CM

## 2023-10-24 DIAGNOSIS — R11.2 NAUSEA AND VOMITING, UNSPECIFIED VOMITING TYPE: ICD-10-CM

## 2023-10-24 DIAGNOSIS — Z79.4 TYPE 2 DIABETES MELLITUS WITH HYPERGLYCEMIA, WITH LONG-TERM CURRENT USE OF INSULIN: ICD-10-CM

## 2023-10-24 DIAGNOSIS — E11.65 TYPE 2 DIABETES MELLITUS WITH HYPERGLYCEMIA, WITH LONG-TERM CURRENT USE OF INSULIN: ICD-10-CM

## 2023-10-24 DIAGNOSIS — I10 POORLY-CONTROLLED HYPERTENSION: ICD-10-CM

## 2023-10-24 DIAGNOSIS — R06.02 SHORTNESS OF BREATH: ICD-10-CM

## 2023-10-24 LAB
EXPIRATION DATE: ABNORMAL
HBA1C MFR BLD: 7.4 % (ref 4.5–5.7)
Lab: ABNORMAL

## 2023-10-24 RX ORDER — LISINOPRIL 40 MG/1
40 TABLET ORAL DAILY
Qty: 90 TABLET | Refills: 1 | Status: SHIPPED | OUTPATIENT
Start: 2023-10-24

## 2023-10-24 RX ORDER — INSULIN GLARGINE 100 [IU]/ML
20 INJECTION, SOLUTION SUBCUTANEOUS DAILY
Qty: 15 ML | Refills: 6 | Status: SHIPPED | OUTPATIENT
Start: 2023-10-24

## 2023-10-24 NOTE — PROGRESS NOTES
"The ABCs of the Annual Wellness Visit  Subsequent Medicare Wellness Visit    Subjective    Dev Ascencio is a 53 y.o. male who presents for a Subsequent Medicare Wellness Visit.    The following portions of the patient's history were reviewed and   updated as appropriate: allergies, current medications, past family history, past medical history, past social history, past surgical history, and problem list.    Compared to one year ago, the patient feels his physical   health is better.    Compared to one year ago, the patient feels his mental   health is the same.    Recent Hospitalizations:  He was NOT admitted within the past 365 days at  hospital.       Current Medical Providers:  Patient Care Team:  Maddi Wilkins MD as PCP - General (Internal Medicine & Pediatrics)    Outpatient Medications Prior to Visit   Medication Sig Dispense Refill    Blood Glucose Monitoring Suppl (ONE TOUCH ULTRA 2) w/Device kit USE TO TEST BLOOD SUGAR 1 each 1    Insulin Pen Needle (Pen Needles 3/16\") 31G X 5 MM misc Use as directed with insulin Pen 30 each 11    Lantus SoloStar 100 UNIT/ML injection pen INJECT 20 UNITS UNDER THE SKIN INTO THE APPROPRIATE AREA AS DIRECTED EVERY NIGHT 15 mL 1    lisinopril (PRINIVIL,ZESTRIL) 20 MG tablet TAKE ONE TABLET BY MOUTH DAILY 90 tablet 0    metFORMIN (Glucophage) 500 MG tablet Take 1 tablet by mouth 2 (Two) Times a Day With Meals. 60 tablet 1    metoclopramide (REGLAN) 10 MG tablet Take 1 tablet by mouth 3 (Three) Times a Week. (Patient not taking: Reported on 10/24/2023)      ondansetron (ZOFRAN) 8 MG tablet TAKE ONE TABLET BY MOUTH EVERY 8 HOURS AS NEEDED FOR NAUSEA OR VOMITING (Patient not taking: Reported on 10/24/2023) 21 tablet 0     No facility-administered medications prior to visit.       No opioid medication identified on active medication list. I have reviewed chart for other potential  high risk medication/s and harmful drug interactions in the elderly.        Aspirin is not on " "active medication list.  Aspirin use is not indicated based on review of current medical condition/s. Risk of harm outweighs potential benefits.  .    Patient Active Problem List   Diagnosis    Hepatitis C    Hypertension    History of endocarditis    Opioid-related disorder     Advance Care Planning   Advance Care Planning     Advance Directive is not on file.  ACP discussion was held with the patient during this visit. Patient does not have an advance directive, declines further assistance.     Objective    Vitals:    10/24/23 0944   BP: (!) 184/110   Pulse: 94   SpO2: 96%   Weight: 117 kg (257 lb 14.4 oz)     Estimated body mass index is 40.39 kg/m² as calculated from the following:    Height as of 5/3/23: 170.2 cm (67\").    Weight as of this encounter: 117 kg (257 lb 14.4 oz).           Does the patient have evidence of cognitive impairment? No    Lab Results   Component Value Date    HGBA1C 7.4 (A) 10/24/2023        HEALTH RISK ASSESSMENT    Smoking Status:  Social History     Tobacco Use   Smoking Status Every Day    Packs/day: 0.25    Years: 40.00    Additional pack years: 0.00    Total pack years: 10.00    Types: Cigarettes    Start date: 1983    Passive exposure: Current   Smokeless Tobacco Never     Alcohol Consumption:  Social History     Substance and Sexual Activity   Alcohol Use Not Currently     Fall Risk Screen:    MARIA LUZ Fall Risk Assessment was completed, and patient is at HIGH risk for falls. Assessment completed on:10/24/2023    Depression Screening:      10/24/2023     9:46 AM   PHQ-2/PHQ-9 Depression Screening   Little Interest or Pleasure in Doing Things 0-->not at all   Feeling Down, Depressed or Hopeless 0-->not at all   PHQ-9: Brief Depression Severity Measure Score 0       Health Habits and Functional and Cognitive Screening:      10/24/2023     9:46 AM   Functional & Cognitive Status   Do you have difficulty preparing food and eating? No   Do you have difficulty bathing yourself, getting " dressed or grooming yourself? No   Do you have difficulty using the toilet? No   Do you have difficulty moving around from place to place? Yes   Do you have trouble with steps or getting out of a bed or a chair? Yes   Current Diet Frequent Junk Food   Dental Exam Up to date   Eye Exam Not up to date   Exercise (times per week) 0 times per week   Current Exercises Include No Regular Exercise   Do you need help using the phone?  No   Are you deaf or do you have serious difficulty hearing?  No   Do you need help to go to places out of walking distance? Yes   Do you need help shopping? Yes   Do you need help preparing meals?  No   Do you need help with housework?  No   Do you need help with laundry? No   Do you need help taking your medications? No   Do you need help managing money? No   Do you ever drive or ride in a car without wearing a seat belt? No   Have you felt unusual stress, anger or loneliness in the last month? Yes   Who do you live with? Spouse   If you need help, do you have trouble finding someone available to you? No   Have you been bothered in the last four weeks by sexual problems? No   Do you have difficulty concentrating, remembering or making decisions? Yes       Age-appropriate Screening Schedule:  Refer to the list below for future screening recommendations based on patient's age, sex and/or medical conditions. Orders for these recommended tests are listed in the plan section. The patient has been provided with a written plan.    Health Maintenance   Topic Date Due    Hepatitis B (1 of 3 - 3-dose series) Never done    URINE MICROALBUMIN  Never done    COLORECTAL CANCER SCREENING  Never done    Pneumococcal Vaccine 0-64 (1 - PCV) Never done    TDAP/TD VACCINES (1 - Tdap) Never done    ZOSTER VACCINE (1 of 2) Never done    ANNUAL WELLNESS VISIT  Never done    DIABETIC FOOT EXAM  Never done    DIABETIC EYE EXAM  Never done    COVID-19 Vaccine (2 - 2023-24 season) 10/26/2023 (Originally 9/1/2023)     INFLUENZA VACCINE  03/31/2024 (Originally 8/1/2023)    BMI FOLLOWUP  03/10/2024    HEMOGLOBIN A1C  04/24/2024    HEPATITIS C SCREENING  Completed                  CMS Preventative Services Quick Reference  Risk Factors Identified During Encounter  Vision Screening Recommended  The above risks/problems have been discussed with the patient.  Pertinent information has been shared with the patient in the After Visit Summary.  An After Visit Summary and PPPS were made available to the patient.    Follow Up:   Next Medicare Wellness visit to be scheduled in 1 year.       Additional E&M Note during same encounter follows:  Patient has multiple medical problems which are significant and separately identifiable that require additional work above and beyond the Medicare Wellness Visit.      Chief Complaint  Medicare Wellness-subsequent (Pt is here for a medicare wellness exam today. )    Subjective        HPI  Dev Ascencio is also being seen today for followup  Diabetes: Patient is currently taking all medications as directed.  Patient is currently checking glucoses and they are as follows 200.  Patient denies any nonhealing skin wounds or ulcers.  Patient denies any changes in vision.  Patient's last diabetic eye exam was on earlier this year.  Is due to go back     Has also been struggling with SOA  especially when laying flat but also happens with walking. Cannot walk far due to left hip OA and prior injury. No chest pain but does get SOA        Objective   Vital Signs:  BP (!) 184/110   Pulse 94   Wt 117 kg (257 lb 14.4 oz)   SpO2 96%   BMI 40.39 kg/m²     Physical Exam                    Assessment and Plan   Diagnoses and all orders for this visit:      1. Medicare annual wellness visit, subsequent      2. Type 2 diabetes mellitus with hyperglycemia, with long-term current use of insulin    - POCT glycated hemoglobin, total  - Insulin Glargine (Lantus SoloStar) 100 UNIT/ML injection pen; Inject 20 Units under  the skin into the appropriate area as directed Daily.  Dispense: 15 mL; Refill: 6  - Adult Transthoracic Echo Complete W/ Cont if Necessary Per Protocol; Future  - Stress Test With Myocardial Perfusion - One Day; Future    3. Poorly-controlled hypertension    - Comprehensive Metabolic Panel  - Adult Transthoracic Echo Complete W/ Cont if Necessary Per Protocol; Future  - Stress Test With Myocardial Perfusion - One Day; Future    4. Hypertension, unspecified type    - lisinopril (PRINIVIL,ZESTRIL) 40 MG tablet; Take 1 tablet by mouth Daily.  Dispense: 90 tablet; Refill: 1    5. Nausea and vomiting, unspecified vomiting type    - metFORMIN (Glucophage) 500 MG tablet; Take 1 tablet by mouth 2 (Two) Times a Day With Meals.  Dispense: 60 tablet; Refill: 1    6. Dermatitis      7. Shortness of breath    - Adult Transthoracic Echo Complete W/ Cont if Necessary Per Protocol; Future  - Stress Test With Myocardial Perfusion - One Day; Future    8. Lower extremity edema    - Adult Transthoracic Echo Complete W/ Cont if Necessary Per Protocol; Future  - Stress Test With Myocardial Perfusion - One Day; Future                 Follow Up   Return in about 4 months (around 2/24/2024) for Followup with Dr Wilkins- IN PERSON.  Patient was given instructions and counseling regarding his condition or for health maintenance advice. Please see specific information pulled into the AVS if appropriate.

## 2023-10-25 LAB
ALBUMIN SERPL-MCNC: 3.9 G/DL (ref 3.8–4.9)
ALBUMIN/GLOB SERPL: 1.5 {RATIO} (ref 1.2–2.2)
ALP SERPL-CCNC: 78 IU/L (ref 44–121)
ALT SERPL-CCNC: 17 IU/L (ref 0–44)
AST SERPL-CCNC: 10 IU/L (ref 0–40)
BILIRUB SERPL-MCNC: 0.4 MG/DL (ref 0–1.2)
BUN SERPL-MCNC: 17 MG/DL (ref 6–24)
BUN/CREAT SERPL: 17 (ref 9–20)
CALCIUM SERPL-MCNC: 9.4 MG/DL (ref 8.7–10.2)
CHLORIDE SERPL-SCNC: 103 MMOL/L (ref 96–106)
CO2 SERPL-SCNC: 23 MMOL/L (ref 20–29)
CREAT SERPL-MCNC: 0.99 MG/DL (ref 0.76–1.27)
EGFRCR SERPLBLD CKD-EPI 2021: 91 ML/MIN/1.73
GLOBULIN SER CALC-MCNC: 2.6 G/DL (ref 1.5–4.5)
GLUCOSE SERPL-MCNC: 136 MG/DL (ref 70–99)
POTASSIUM SERPL-SCNC: 4.3 MMOL/L (ref 3.5–5.2)
PROT SERPL-MCNC: 6.5 G/DL (ref 6–8.5)
SODIUM SERPL-SCNC: 141 MMOL/L (ref 134–144)

## 2024-03-21 ENCOUNTER — TELEPHONE (OUTPATIENT)
Dept: FAMILY MEDICINE CLINIC | Facility: CLINIC | Age: 54
End: 2024-03-21

## 2024-03-21 NOTE — TELEPHONE ENCOUNTER
I called Ramiro back, she says pt has gone off to do a job for a while and he won't be back until this afternoon. She has been trying to convince pt to get treatment for days now. I let her know I spoke to Dr. Wilkins and she advises pt to go to the ER. I told Ramiro to call me back if pt refuses again.

## 2024-03-21 NOTE — TELEPHONE ENCOUNTER
Told him that he needed to go to the ER and he has appt tomorrow but he didn't sound like he was going to go.     Says he doesn't have a glucometer around him to check it right now but strongly advised to go to the ER.

## 2024-03-21 NOTE — TELEPHONE ENCOUNTER
PATIENT'S S/O BISHOP CALLED TO MAKE AN APPOINTMENT FOR THE PATIENT. REPORTS THAT HIS BLOOD SUGAR HAS BEEN IN -600'S FOR OVER A WEEK. REPORTS THAT THE LOWEST READING, FASTING, FIRST THING IN THE MORNING . REPORTS THAT HE HAS BEEN COMPLIANT WITH INSULIN. REPORTS THAT HE HAS BEEN THROWING UP AND IS EXPERIENCING DIFFICULTY URINATING.  STRONGLY ADVISED BISHOP TO HAVE THE PATIENT GO TO [facility]. BISHOP STATES THAT THE PATIENT ONLY WISHES TO SEE DR. PHELPS.  SCHEDULED W/ DR. BUTTERFIELD TOMORROW AT 2:45. ADVISED BISHOP TO CONTINUE TO ENCOURAGE THE PATIENT TO SEEK URGENT OR EMERGENCY TREATMENT. ADVISED THAT CLINICAL STAFF MIGHT DO THE SAME IF THEY CALL BISHOP BACK. BISHOP VERBALIZED UNDERSTANDING.

## 2024-03-22 ENCOUNTER — OFFICE VISIT (OUTPATIENT)
Dept: FAMILY MEDICINE CLINIC | Facility: CLINIC | Age: 54
End: 2024-03-22
Payer: MEDICARE

## 2024-03-22 ENCOUNTER — TELEPHONE (OUTPATIENT)
Dept: FAMILY MEDICINE CLINIC | Facility: CLINIC | Age: 54
End: 2024-03-22

## 2024-03-22 VITALS
OXYGEN SATURATION: 95 % | BODY MASS INDEX: 38.64 KG/M2 | HEIGHT: 67 IN | WEIGHT: 246.2 LBS | HEART RATE: 112 BPM | DIASTOLIC BLOOD PRESSURE: 64 MMHG | SYSTOLIC BLOOD PRESSURE: 110 MMHG

## 2024-03-22 DIAGNOSIS — R05.1 ACUTE COUGH: ICD-10-CM

## 2024-03-22 DIAGNOSIS — Z79.899 ENCOUNTER FOR LONG-TERM (CURRENT) USE OF OTHER MEDICATIONS: ICD-10-CM

## 2024-03-22 DIAGNOSIS — R63.4 LOSS OF WEIGHT: ICD-10-CM

## 2024-03-22 DIAGNOSIS — R11.2 NAUSEA AND VOMITING, UNSPECIFIED VOMITING TYPE: ICD-10-CM

## 2024-03-22 DIAGNOSIS — J02.9 ACUTE PHARYNGITIS, UNSPECIFIED ETIOLOGY: ICD-10-CM

## 2024-03-22 DIAGNOSIS — E11.65 UNCONTROLLED TYPE 2 DIABETES MELLITUS WITH HYPERGLYCEMIA: Primary | ICD-10-CM

## 2024-03-22 DIAGNOSIS — Z91.199 HISTORY OF NONCOMPLIANCE WITH MEDICAL TREATMENT, PRESENTING HAZARDS TO HEALTH: ICD-10-CM

## 2024-03-22 DIAGNOSIS — R53.83 OTHER FATIGUE: ICD-10-CM

## 2024-03-22 PROCEDURE — 3078F DIAST BP <80 MM HG: CPT | Performed by: FAMILY MEDICINE

## 2024-03-22 PROCEDURE — 99214 OFFICE O/P EST MOD 30 MIN: CPT | Performed by: FAMILY MEDICINE

## 2024-03-22 PROCEDURE — 3074F SYST BP LT 130 MM HG: CPT | Performed by: FAMILY MEDICINE

## 2024-03-22 PROCEDURE — 82948 REAGENT STRIP/BLOOD GLUCOSE: CPT | Performed by: FAMILY MEDICINE

## 2024-03-22 PROCEDURE — 1160F RVW MEDS BY RX/DR IN RCRD: CPT | Performed by: FAMILY MEDICINE

## 2024-03-22 PROCEDURE — 1159F MED LIST DOCD IN RCRD: CPT | Performed by: FAMILY MEDICINE

## 2024-03-22 NOTE — PROGRESS NOTES
"Chief Complaint  Diabetes    Subjective      Dev Ascencio presents to [facility]  History of Present Illness  Patient's here today because he says his blood sugars been extremely high lately, and his wife says that he has been running up as high as 600 at home.  The patient was advised repeatedly to go the emergency room yesterday but he refused.  I have not seen the patient before, and unfortunately he is a somewhat poor historian.  According to his wife, he has been a little bit mentally cloudy intermittently for the last 2 weeks, especially when his sugars over 500.  The patient says he is extremely thirsty and cannot get enough to drink, and is urinating very frequently.  He has had a sore throat for couple of weeks and they did have a granddaughter that had strep.  He says he coughs to the point of vomiting, but he has not noted any chest pain or significant changes in his breathing, although he says he wheezes all the time (even though there is no inhalers listed in his chart).  His wife says he was diagnosed with diabetes about a year ago.  He denies any abdominal pain but he has been coughing until he vomits several times a day.  Objective   Vital Signs:   Vitals:    03/22/24 1501   BP: 110/64   BP Location: Left arm   Patient Position: Sitting   Cuff Size: Adult   Pulse: 112   SpO2: 95%   Weight: 112 kg (246 lb 3.2 oz)   Height: 170.2 cm (67\")      /64 (BP Location: Left arm, Patient Position: Sitting, Cuff Size: Adult)   Pulse 112   Ht 170.2 cm (67\")   Wt 112 kg (246 lb 3.2 oz)   SpO2 95%   BMI 38.56 kg/m²     Body mass index is 38.56 kg/m².    Review of Systems    Past History:  Medical History: has a past medical history of Arthritis, Bone injury, Heart valve disorder (2012), Hepatitis C, Hypertension, Opioid-related disorder, and Pulmonary embolism (2007).   Surgical History: has a past surgical history that includes orthopedic surgery; Joint replacement; " "and Hip surgery (Left).   Family History: family history includes Alcohol abuse in his father; Allergies in his mother and another family member; Breast cancer in his mother; Diabetes in his mother and another family member; Eczema in his mother and another family member; Obesity in his mother and another family member; Other in his mother; Stomach cancer in his father.   Social History: reports that he has been smoking cigarettes. He started smoking about 41 years ago. He has a 10.3 pack-year smoking history. He has been exposed to tobacco smoke. He has never used smokeless tobacco. He reports that he does not currently use alcohol. Drug use questions deferred to the physician.      Current Outpatient Medications:     Blood Glucose Monitoring Suppl (ONE TOUCH ULTRA 2) w/Device kit, USE TO TEST BLOOD SUGAR, Disp: 1 each, Rfl: 1    Insulin Glargine (Lantus SoloStar) 100 UNIT/ML injection pen, Inject 20 Units under the skin into the appropriate area as directed Daily., Disp: 15 mL, Rfl: 6    Insulin Pen Needle (Pen Needles 3/16\") 31G X 5 MM misc, Use as directed with insulin Pen, Disp: 30 each, Rfl: 11    lisinopril (PRINIVIL,ZESTRIL) 40 MG tablet, Take 1 tablet by mouth Daily., Disp: 90 tablet, Rfl: 1    metFORMIN (Glucophage) 500 MG tablet, Take 1 tablet by mouth 2 (Two) Times a Day With Meals., Disp: 60 tablet, Rfl: 1    Allergies: Patient has no known allergies.    Physical Exam  Constitutional:       General: He is not in acute distress.     Appearance: He is obese. He is ill-appearing (Mildly ill-appearing). He is not toxic-appearing.   HENT:      Head: Normocephalic.      Comments: Breath has a fruity odor     Right Ear: Tympanic membrane, ear canal and external ear normal.      Left Ear: Tympanic membrane, ear canal and external ear normal.      Nose: Nose normal. Rhinorrhea present. No congestion.      Mouth/Throat:      Mouth: Mucous membranes are moist.      Pharynx: Oropharynx is clear. Posterior " oropharyngeal erythema present. No oropharyngeal exudate.   Eyes:      General: Scleral icterus present.         Right eye: No discharge.         Left eye: No discharge.      Extraocular Movements: Extraocular movements intact.      Pupils: Pupils are equal, round, and reactive to light.   Cardiovascular:      Rate and Rhythm: Tachycardia present.      Heart sounds: No murmur heard.     No friction rub. No gallop.   Pulmonary:      Effort: Pulmonary effort is normal.      Breath sounds: No stridor. Wheezing (Mild scattered occasional) and rhonchi (Mild scattered occasional) present. No rales.   Abdominal:      General: Bowel sounds are normal. There is no distension.      Palpations: Abdomen is soft. There is no mass.      Tenderness: There is no abdominal tenderness. There is no guarding or rebound.   Musculoskeletal:      Cervical back: Normal range of motion. No tenderness.      Right lower leg: No edema.      Left lower leg: No edema.   Lymphadenopathy:      Cervical: No cervical adenopathy.   Skin:     General: Skin is warm and dry.      Capillary Refill: Capillary refill takes less than 2 seconds.      Findings: No bruising, erythema or rash.   Neurological:      Mental Status: He is confused.      Cranial Nerves: Cranial nerves 2-12 are intact. No facial asymmetry.      Motor: No weakness, tremor or atrophy.      Comments: Patient does not appear drowsy, but appears somewhat confused, and is somewhat slow to answer questions   Psychiatric:         Attention and Perception: Attention and perception normal.         Mood and Affect: Mood normal.         Speech: Speech is delayed.         Behavior: Behavior is cooperative.         Thought Content: Thought content is not paranoid. Thought content does not include homicidal or suicidal ideation.         Cognition and Memory: Cognition is impaired.         Judgment: Judgment is impulsive.      Comments: Patient does not seem to be completely clear from a cognitive  "standpoint, although not grossly abnormal, just a bit \"glassy eyed\"                   Assessment and Plan   Diagnoses and all orders for this visit:    1. Uncontrolled type 2 diabetes mellitus with hyperglycemia (Primary)  -     POC Glucose  Glucose was 436 today, but the patient appears to be somewhat confused, and has a fruity odor to his breath highly suggestive of DKA.  He has reportedly had glucose levels in the 500-600 range off and on for the past 2 weeks along with recurrent vomiting, and unquenchable thirst.  I explained to the patient and his wife that he needs to go the emergency room immediately, and otherwise he could go into a coma and die.  I explained that there is no safe alternative because of my concerned about diabetic ketoacidosis, and that he needs prompt labs and IV fluids and IV insulin to get his problem under control, and explained to him and his wife that his cognition would be impaired because of his medical condition, so if she needs to call an ambulance because he becomes uncooperative then she should do so.  He agreed to go when she says she will drive him there.  Since his blood pressure was satisfactory, and his tachycardia was mild at about 117 beats a minute, I believe he would be safe to go by private car  2. History of noncompliance with medical treatment, presenting hazards to health    3. Loss of weight    4. Other fatigue    5. Encounter for long-term (current) use of other medications    6. Acute cough    7. Acute pharyngitis, unspecified etiology    8. Nausea and vomiting, unspecified vomiting type            Follow Up   Return in about 1 week (around 3/29/2024) for Recheck.  Patient was given instructions and counseling regarding his condition or for health maintenance advice. Please see specific information pulled into the AVS if appropriate.     Jae Johnson MD  "

## 2024-03-23 ENCOUNTER — TELEPHONE (OUTPATIENT)
Dept: FAMILY MEDICINE CLINIC | Facility: CLINIC | Age: 54
End: 2024-03-23
Payer: MEDICARE

## 2024-03-25 LAB — GLUCOSE BLDC GLUCOMTR-MCNC: 431 MG/DL (ref 70–130)

## 2024-06-06 DIAGNOSIS — Z79.4 TYPE 2 DIABETES MELLITUS WITH HYPERGLYCEMIA, WITH LONG-TERM CURRENT USE OF INSULIN: ICD-10-CM

## 2024-06-06 DIAGNOSIS — E11.65 TYPE 2 DIABETES MELLITUS WITH HYPERGLYCEMIA, WITH LONG-TERM CURRENT USE OF INSULIN: ICD-10-CM

## 2024-06-06 RX ORDER — PEN NEEDLE, DIABETIC 31 GX3/16"
NEEDLE, DISPOSABLE MISCELLANEOUS DAILY
Qty: 100 EACH | Refills: 0 | Status: SHIPPED | OUTPATIENT
Start: 2024-06-06

## 2024-06-06 NOTE — TELEPHONE ENCOUNTER
Hub relay: left message patient 's medication has been refilled and they need to schedule a medication recheck with Dr Wilkins

## 2024-06-12 ENCOUNTER — OFFICE VISIT (OUTPATIENT)
Dept: FAMILY MEDICINE CLINIC | Facility: CLINIC | Age: 54
End: 2024-06-12
Payer: MEDICARE

## 2024-06-12 VITALS
SYSTOLIC BLOOD PRESSURE: 132 MMHG | OXYGEN SATURATION: 95 % | WEIGHT: 217 LBS | BODY MASS INDEX: 34.06 KG/M2 | HEIGHT: 67 IN | HEART RATE: 112 BPM | DIASTOLIC BLOOD PRESSURE: 84 MMHG

## 2024-06-12 DIAGNOSIS — R35.0 URINARY FREQUENCY: ICD-10-CM

## 2024-06-12 DIAGNOSIS — E11.65 TYPE 2 DIABETES MELLITUS WITH HYPERGLYCEMIA, WITH LONG-TERM CURRENT USE OF INSULIN: ICD-10-CM

## 2024-06-12 DIAGNOSIS — Z79.4 TYPE 2 DIABETES MELLITUS WITH HYPERGLYCEMIA, WITH LONG-TERM CURRENT USE OF INSULIN: ICD-10-CM

## 2024-06-12 DIAGNOSIS — K62.5 RECTAL BLEEDING: ICD-10-CM

## 2024-06-12 DIAGNOSIS — Z91.199 HISTORY OF NONCOMPLIANCE WITH MEDICAL TREATMENT, PRESENTING HAZARDS TO HEALTH: Primary | ICD-10-CM

## 2024-06-12 DIAGNOSIS — E11.65 TYPE 2 DIABETES MELLITUS WITH HYPERGLYCEMIA, UNSPECIFIED WHETHER LONG TERM INSULIN USE: ICD-10-CM

## 2024-06-12 DIAGNOSIS — I10 HYPERTENSION, UNSPECIFIED TYPE: ICD-10-CM

## 2024-06-12 DIAGNOSIS — J44.9 CHRONIC OBSTRUCTIVE PULMONARY DISEASE, UNSPECIFIED COPD TYPE: ICD-10-CM

## 2024-06-12 LAB
A/C: NORMAL
EXPIRATION DATE: NORMAL
Lab: NORMAL
POC CREATININE URINE: 10
POC MICROALBUMIN URINE: 10

## 2024-06-12 PROCEDURE — 99214 OFFICE O/P EST MOD 30 MIN: CPT | Performed by: INTERNAL MEDICINE

## 2024-06-12 PROCEDURE — 1126F AMNT PAIN NOTED NONE PRSNT: CPT | Performed by: INTERNAL MEDICINE

## 2024-06-12 PROCEDURE — 82044 UR ALBUMIN SEMIQUANTITATIVE: CPT | Performed by: INTERNAL MEDICINE

## 2024-06-12 PROCEDURE — 3075F SYST BP GE 130 - 139MM HG: CPT | Performed by: INTERNAL MEDICINE

## 2024-06-12 PROCEDURE — 3079F DIAST BP 80-89 MM HG: CPT | Performed by: INTERNAL MEDICINE

## 2024-06-12 PROCEDURE — 3046F HEMOGLOBIN A1C LEVEL >9.0%: CPT | Performed by: INTERNAL MEDICINE

## 2024-06-12 RX ORDER — INSULIN GLARGINE 100 [IU]/ML
20 INJECTION, SOLUTION SUBCUTANEOUS 2 TIMES DAILY
Qty: 30 ML | Refills: 6 | Status: SHIPPED | OUTPATIENT
Start: 2024-06-12

## 2024-06-12 RX ORDER — ALBUTEROL SULFATE 90 UG/1
2 AEROSOL, METERED RESPIRATORY (INHALATION) EVERY 4 HOURS PRN
Qty: 18 G | Refills: 1 | Status: SHIPPED | OUTPATIENT
Start: 2024-06-12

## 2024-06-12 RX ORDER — LISINOPRIL 40 MG/1
40 TABLET ORAL DAILY
Qty: 90 TABLET | Refills: 1 | Status: SHIPPED | OUTPATIENT
Start: 2024-06-12

## 2024-06-12 NOTE — PROGRESS NOTES
Office Note     Name: Dev Ascencio    : 1970     MRN: [id number]     Chief Complaint  Diabetes (Pt is here for a medication recheck on dm. Wife (Ramiro) is with patient and states that blood sugars have been up in the 600. He states that he d/c metformin due to upset stomach. ), Hypertension, Insomnia (Pt complains of insomnia. ), and Rectal Bleeding (Pt also states that he has had some rectal bleeding. )    Subjective     History of Present Illness:  Dev Ascencio is a 54 y.o. male who is extremely noncompliant with his medical care.  He has no showed or canceled his last 4 appointments with me.  He was last seen in 2023.  He had poorly controlled blood pressure as well and has diabetes at that time and was supposed to follow-up with me a couple months later to follow-up on his blood pressure but he did not keep the appointment.  He was seen by another provider in this office in 2024.  At that time his glucose was greater than 500 he was confused with slurred speech and was advised to go to the ER.  We have not seen him since that time.  Since that visit he has been only on Lantus.  He stopped taking his metformin.      Rectal Bleeding: BRBPR has had  2 episodes so far, thought it was hemorrhoids, lasted a couple days then got better then came back     Review of Systems:   Review of Systems    Past Medical History:   Past Medical History:   Diagnosis Date    Arthritis     Bone injury     BONE OR JOINT INJURIES    Heart valve disorder     Hepatitis C     Hypertension     Opioid-related disorder     Pulmonary embolism     Patient states this is inaccurate       Past Surgical History:   Past Surgical History:   Procedure Laterality Date    HIP SURGERY Left     X2    JOINT REPLACEMENT      ORTHOPEDIC SURGERY         Family History:   Family History   Problem Relation Age of Onset    Diabetes Mother     Allergies Mother     Obesity Mother     Other Mother         BLOOD DISEASE  "   Eczema Mother     Breast cancer Mother     Alcohol abuse Father     Stomach cancer Father     Eczema Other     Allergies Other     Diabetes Other     Obesity Other        Social History:   Social History     Socioeconomic History    Marital status: Significant Other   Tobacco Use    Smoking status: Every Day     Current packs/day: 0.25     Average packs/day: 0.3 packs/day for 41.5 years (10.4 ttl pk-yrs)     Types: Cigarettes     Start date: 1983     Passive exposure: Current    Smokeless tobacco: Never   Vaping Use    Vaping status: Some Days    Substances: Nicotine, Flavoring   Substance and Sexual Activity    Alcohol use: Not Currently    Drug use: Defer     Types: Heroin    Sexual activity: Defer       Immunizations:   Immunization History   Administered Date(s) Administered    COVID-19 (MODERNA) 1st,2nd,3rd Dose Monovalent 01/05/2022    Hepatitis A 10/26/2018        Medications:     Current Outpatient Medications:     Blood Glucose Monitoring Suppl (ONE TOUCH ULTRA 2) w/Device kit, USE TO TEST BLOOD SUGAR, Disp: 1 each, Rfl: 1    Insulin Glargine (Lantus SoloStar) 100 UNIT/ML injection pen, Inject 20 Units under the skin into the appropriate area as directed 2 (Two) Times a Day., Disp: 30 mL, Rfl: 6    Insulin Pen Needle ([employer] Pen Needles) 31G X 5 MM misc, USE ONCE DAILY, Disp: 100 each, Rfl: 0    lisinopril (PRINIVIL,ZESTRIL) 40 MG tablet, Take 1 tablet by mouth Daily., Disp: 90 tablet, Rfl: 1    albuterol sulfate  (90 Base) MCG/ACT inhaler, Inhale 2 puffs Every 4 (Four) Hours As Needed for Wheezing or Shortness of Air., Disp: 18 g, Rfl: 1    Allergies:   Allergies   Allergen Reactions    Metformin Nausea And Vomiting       Objective     Vital Signs  /84   Pulse 112   Ht 170.2 cm (67\")   Wt 98.4 kg (217 lb)   SpO2 95%   BMI 33.99 kg/m²   Estimated body mass index is 33.99 kg/m² as calculated from the following:    Height as of this encounter: 170.2 cm (67\").    Weight as of this " encounter: 98.4 kg (217 lb).          Physical Exam  Vitals and nursing note reviewed.   Constitutional:       Appearance: Normal appearance.   Cardiovascular:      Rate and Rhythm: Normal rate and regular rhythm.      Heart sounds: No murmur heard.     No friction rub. No gallop.   Pulmonary:      Effort: Pulmonary effort is normal.      Breath sounds: Normal breath sounds. No wheezing, rhonchi or rales.   Neurological:      Mental Status: He is alert.            Procedures     Assessment and Plan     Diagnoses:    ICD-10-CM ICD-9-CM   1. History of noncompliance with medical treatment, presenting hazards to health  Z91.199 V15.81   2. Type 2 diabetes mellitus with hyperglycemia, unspecified whether long term insulin use  E11.65 250.00   3. Urinary frequency  R35.0 788.41   4. Rectal bleeding  K62.5 569.3   5. Hypertension, unspecified type  I10 401.9   6. Type 2 diabetes mellitus with hyperglycemia, with long-term current use of insulin  E11.65 250.00    Z79.4 790.29     V58.67   7. Chronic obstructive pulmonary disease, unspecified COPD type  J44.9 496       Plan:  1. History of noncompliance with medical treatment, presenting hazards to health    - Comprehensive Metabolic Panel  - Protime-INR  - APTT  - CBC & Differential  - Hemoglobin A1c  - TSH Rfx On Abnormal To Free T4  - Lipid Panel    2. Type 2 diabetes mellitus with hyperglycemia, unspecified whether long term insulin use    - POC Microalbumin  - Comprehensive Metabolic Panel  - Protime-INR  - APTT  - CBC & Differential  - Hemoglobin A1c  - TSH Rfx On Abnormal To Free T4  - Lipid Panel    3. Urinary frequency    - POC Urinalysis Dipstick, Automated  - Comprehensive Metabolic Panel  - Protime-INR  - APTT  - CBC & Differential  - Hemoglobin A1c  - TSH Rfx On Abnormal To Free T4  - Lipid Panel    4. Rectal bleeding    - Comprehensive Metabolic Panel  - Protime-INR  - APTT  - CBC & Differential  - Hemoglobin A1c  - TSH Rfx On Abnormal To Free T4  - Lipid  Panel  - Ambulatory Referral to Gastroenterology    5. Hypertension, unspecified type    - Comprehensive Metabolic Panel  - Protime-INR  - APTT  - CBC & Differential  - Hemoglobin A1c  - TSH Rfx On Abnormal To Free T4  - Lipid Panel  - lisinopril (PRINIVIL,ZESTRIL) 40 MG tablet; Take 1 tablet by mouth Daily.  Dispense: 90 tablet; Refill: 1    6. Type 2 diabetes mellitus with hyperglycemia, with long-term current use of insulin    - Comprehensive Metabolic Panel  - Protime-INR  - APTT  - CBC & Differential  - Hemoglobin A1c  - TSH Rfx On Abnormal To Free T4  - Lipid Panel  - Insulin Glargine (Lantus SoloStar) 100 UNIT/ML injection pen; Inject 20 Units under the skin into the appropriate area as directed 2 (Two) Times a Day.  Dispense: 30 mL; Refill: 6    7. Chronic obstructive pulmonary disease, unspecified COPD type         Follow Up  No follow-ups on file.    Patient was advised to call the office or seek medical care if  any issues discussed during this visit worsen or persist or if new concerns arise        MD FIDEL [facility]  [address] [place] [id number] [phone]

## 2024-06-13 ENCOUNTER — TELEPHONE (OUTPATIENT)
Dept: FAMILY MEDICINE CLINIC | Facility: CLINIC | Age: 54
End: 2024-06-13
Payer: MEDICARE

## 2024-06-13 LAB
ALBUMIN SERPL-MCNC: 4 G/DL (ref 3.8–4.9)
ALBUMIN/GLOB SERPL: 1.3 {RATIO}
ALP SERPL-CCNC: 116 IU/L (ref 44–121)
ALT SERPL-CCNC: 29 IU/L (ref 0–44)
APTT PPP: 28 SEC (ref 24–33)
AST SERPL-CCNC: 20 IU/L (ref 0–40)
BASOPHILS # BLD AUTO: 0 X10E3/UL (ref 0–0.2)
BASOPHILS NFR BLD AUTO: 0 %
BILIRUB SERPL-MCNC: 0.7 MG/DL (ref 0–1.2)
BUN SERPL-MCNC: 14 MG/DL (ref 6–24)
BUN/CREAT SERPL: 17 (ref 9–20)
CALCIUM SERPL-MCNC: 9.9 MG/DL (ref 8.7–10.2)
CHLORIDE SERPL-SCNC: 93 MMOL/L (ref 96–106)
CHOLEST SERPL-MCNC: 192 MG/DL (ref 100–199)
CO2 SERPL-SCNC: 21 MMOL/L (ref 20–29)
CREAT SERPL-MCNC: 0.81 MG/DL (ref 0.76–1.27)
EGFRCR SERPLBLD CKD-EPI 2021: 104 ML/MIN/1.73
EOSINOPHIL # BLD AUTO: 0.1 X10E3/UL (ref 0–0.4)
EOSINOPHIL NFR BLD AUTO: 1 %
ERYTHROCYTE [DISTWIDTH] IN BLOOD BY AUTOMATED COUNT: 12.3 % (ref 11.6–15.4)
GLOBULIN SER CALC-MCNC: 3.2 G/DL (ref 1.5–4.5)
GLUCOSE SERPL-MCNC: 536 MG/DL (ref 70–99)
HBA1C MFR BLD: >15.5 % (ref 4.8–5.6)
HCT VFR BLD AUTO: 47.9 % (ref 37.5–51)
HDLC SERPL-MCNC: 36 MG/DL
HGB BLD-MCNC: 16.1 G/DL (ref 13–17.7)
IMM GRANULOCYTES # BLD AUTO: 0.1 X10E3/UL (ref 0–0.1)
IMM GRANULOCYTES NFR BLD AUTO: 1 %
INR PPP: 0.9 (ref 0.9–1.2)
LDLC SERPL CALC-MCNC: 106 MG/DL (ref 0–99)
LYMPHOCYTES # BLD AUTO: 2.3 X10E3/UL (ref 0.7–3.1)
LYMPHOCYTES NFR BLD AUTO: 25 %
MCH RBC QN AUTO: 31 PG (ref 26.6–33)
MCHC RBC AUTO-ENTMCNC: 33.6 G/DL (ref 31.5–35.7)
MCV RBC AUTO: 92 FL (ref 79–97)
MONOCYTES # BLD AUTO: 0.6 X10E3/UL (ref 0.1–0.9)
MONOCYTES NFR BLD AUTO: 7 %
NEUTROPHILS # BLD AUTO: 6.1 X10E3/UL (ref 1.4–7)
NEUTROPHILS NFR BLD AUTO: 66 %
PLATELET # BLD AUTO: 280 X10E3/UL (ref 150–450)
POTASSIUM SERPL-SCNC: 4.5 MMOL/L (ref 3.5–5.2)
PROT SERPL-MCNC: 7.2 G/DL (ref 6–8.5)
PROTHROMBIN TIME: 9.8 SEC (ref 9.1–12)
RBC # BLD AUTO: 5.2 X10E6/UL (ref 4.14–5.8)
SODIUM SERPL-SCNC: 131 MMOL/L (ref 134–144)
TRIGL SERPL-MCNC: 291 MG/DL (ref 0–149)
TSH SERPL DL<=0.005 MIU/L-ACNC: 1.15 UIU/ML (ref 0.45–4.5)
VLDLC SERPL CALC-MCNC: 50 MG/DL (ref 5–40)
WBC # BLD AUTO: 9.2 X10E3/UL (ref 3.4–10.8)

## 2024-10-14 DIAGNOSIS — E11.65 TYPE 2 DIABETES MELLITUS WITH HYPERGLYCEMIA, WITH LONG-TERM CURRENT USE OF INSULIN: ICD-10-CM

## 2024-10-14 DIAGNOSIS — Z79.4 TYPE 2 DIABETES MELLITUS WITH HYPERGLYCEMIA, WITH LONG-TERM CURRENT USE OF INSULIN: ICD-10-CM

## 2024-10-14 RX ORDER — PEN NEEDLE, DIABETIC 31 GX3/16"
NEEDLE, DISPOSABLE MISCELLANEOUS DAILY
Qty: 100 EACH | Refills: 0 | Status: SHIPPED | OUTPATIENT
Start: 2024-10-14

## 2024-12-20 ENCOUNTER — OFFICE VISIT (OUTPATIENT)
Dept: FAMILY MEDICINE CLINIC | Facility: CLINIC | Age: 54
End: 2024-12-20
Payer: MEDICARE

## 2024-12-20 VITALS
HEIGHT: 67 IN | WEIGHT: 201.8 LBS | DIASTOLIC BLOOD PRESSURE: 90 MMHG | OXYGEN SATURATION: 100 % | SYSTOLIC BLOOD PRESSURE: 142 MMHG | BODY MASS INDEX: 31.67 KG/M2 | HEART RATE: 120 BPM

## 2024-12-20 DIAGNOSIS — E11.65 TYPE 2 DIABETES MELLITUS WITH HYPERGLYCEMIA, WITH LONG-TERM CURRENT USE OF INSULIN: Primary | ICD-10-CM

## 2024-12-20 DIAGNOSIS — B18.2 CHRONIC HEPATITIS C WITHOUT HEPATIC COMA: ICD-10-CM

## 2024-12-20 DIAGNOSIS — I10 PRIMARY HYPERTENSION: ICD-10-CM

## 2024-12-20 DIAGNOSIS — Z79.4 TYPE 2 DIABETES MELLITUS WITH HYPERGLYCEMIA, WITH LONG-TERM CURRENT USE OF INSULIN: Primary | ICD-10-CM

## 2024-12-20 DIAGNOSIS — F19.10 POLYSUBSTANCE ABUSE: ICD-10-CM

## 2024-12-20 DIAGNOSIS — Z91.199 HISTORY OF NONCOMPLIANCE WITH MEDICAL TREATMENT, PRESENTING HAZARDS TO HEALTH: ICD-10-CM

## 2024-12-20 DIAGNOSIS — G47.09 OTHER INSOMNIA: ICD-10-CM

## 2024-12-20 PROCEDURE — 3077F SYST BP >= 140 MM HG: CPT | Performed by: INTERNAL MEDICINE

## 2024-12-20 PROCEDURE — 3046F HEMOGLOBIN A1C LEVEL >9.0%: CPT | Performed by: INTERNAL MEDICINE

## 2024-12-20 PROCEDURE — 1126F AMNT PAIN NOTED NONE PRSNT: CPT | Performed by: INTERNAL MEDICINE

## 2024-12-20 PROCEDURE — 99214 OFFICE O/P EST MOD 30 MIN: CPT | Performed by: INTERNAL MEDICINE

## 2024-12-20 PROCEDURE — 3080F DIAST BP >= 90 MM HG: CPT | Performed by: INTERNAL MEDICINE

## 2024-12-20 RX ORDER — MIRTAZAPINE 7.5 MG/1
7.5 TABLET, FILM COATED ORAL NIGHTLY
Qty: 90 TABLET | Refills: 1 | Status: SHIPPED | OUTPATIENT
Start: 2024-12-20

## 2024-12-20 RX ORDER — INSULIN GLARGINE 100 [IU]/ML
30 INJECTION, SOLUTION SUBCUTANEOUS 2 TIMES DAILY
Qty: 30 ML | Refills: 6 | Status: SHIPPED | OUTPATIENT
Start: 2024-12-20

## 2024-12-20 RX ORDER — LISINOPRIL 40 MG/1
40 TABLET ORAL DAILY
Qty: 90 TABLET | Refills: 1 | Status: SHIPPED | OUTPATIENT
Start: 2024-12-20

## 2024-12-20 RX ORDER — GLIPIZIDE 10 MG/1
10 TABLET ORAL
Qty: 180 TABLET | Refills: 1 | Status: SHIPPED | OUTPATIENT
Start: 2024-12-20

## 2024-12-20 NOTE — LETTER
December 20, 2024    Dev Ascencio  [address] [place] [address] [address] [place] [address]          It is extremely important that patients with diabetes have regular eye exams even if the diabetes is well controlled.   Please make sure your eye doctor knows that you have diabetes.    We do not have record of a recent eye exam. Please contact your eye doctor and have them fax your most recent diabetic eye exam to our office at [phone].    If you need a new referral to an eye doctor please let me know.      Sincerely,      MD Maddi Boykin MD

## 2024-12-20 NOTE — PROGRESS NOTES
Office Note     Name: Dev Ascencio    : 1970     MRN: [id number]     Chief Complaint  Diabetes (Patient presents today for medication recheck.)    Subjective     History of Present Illness:  Dev Ascencio is a 54 y.o. male who presents today for:  Patient has been incredibly noncompliant with his medical care.  He has missed multiple appointments with me.  He has also failed to complete the referrals I have set him up for previously for active hepatitis C and rectal bleeding.  History of Present Illness  The patient is a 54-year-old male who presents today to follow up for uncontrolled diabetes and uncontrolled hypertension.    He has been monitoring his blood glucose levels, which have consistently remained elevated. The lowest recorded level was 212, while recent readings have been as high as 500. He reports no leg swelling and notes a recent weight loss. He has regular follow-ups with an ophthalmologist and recently received new glasses. His ophthalmologist is aware of his diabetes. His current regimen includes Lantus 30 units twice daily. He was previously on metformin but discontinued it due to intolerance.    He has been experiencing sleep disturbances, which he attributes to stress. He also reports increased irritability and a tendency to become easily agitated. He has a history of anxiety and depression, for which he was previously prescribed Xanax during his residence in [place]. He has been experiencing visual hallucinations for the past 2 months. He has a history of methamphetamine use but discontinued it due to the onset of these hallucinations. He also reports past heroin use, which resulted in respiratory distress and cyanosis so he no longer uses heroine. He was informed by a healthcare professional that his substance use may have adversely affected his pancreatic function.    He has not yet consulted a hepatologist despite a previous recommendation. He has undergone an x-ray and  abdominal examination at a clinic in [place].    SOCIAL HISTORY  He does not drink alcohol. He admits to past meth and heroin use.    MEDICATIONS  Current: Lantus  Past: Xanax    Hep C: was referred to GI in 2023 for treatment      Review of Systems:   Review of Systems    Past Medical History:   Past Medical History:   Diagnosis Date    Arthritis     Bone injury     BONE OR JOINT INJURIES    Heart valve disorder 2012    Hepatitis C     Hypertension     Opioid-related disorder     Pulmonary embolism 2007    Patient states this is inaccurate       Past Surgical History:   Past Surgical History:   Procedure Laterality Date    HIP SURGERY Left     X2    JOINT REPLACEMENT      ORTHOPEDIC SURGERY         Family History:   Family History   Problem Relation Age of Onset    Diabetes Mother     Allergies Mother     Obesity Mother     Other Mother         BLOOD DISEASE    Eczema Mother     Breast cancer Mother     Alcohol abuse Father     Stomach cancer Father     Eczema Other     Allergies Other     Diabetes Other     Obesity Other        Social History:   Social History     Socioeconomic History    Marital status: Significant Other   Tobacco Use    Smoking status: Every Day     Current packs/day: 0.25     Average packs/day: 0.2 packs/day for 42.0 years (10.5 ttl pk-yrs)     Types: Cigarettes     Start date: 1983     Passive exposure: Current    Smokeless tobacco: Never   Vaping Use    Vaping status: Some Days    Substances: Nicotine, Flavoring   Substance and Sexual Activity    Alcohol use: Not Currently    Drug use: Defer     Types: Heroin    Sexual activity: Defer       Immunizations:   Immunization History   Administered Date(s) Administered    COVID-19 (MODERNA) 1st,2nd,3rd Dose Monovalent 01/05/2022    Hepatitis A 10/26/2018        Medications:     Current Outpatient Medications:     albuterol sulfate  (90 Base) MCG/ACT inhaler, Inhale 2 puffs Every 4 (Four) Hours As Needed for Wheezing or Shortness of Air.,  "Disp: 18 g, Rfl: 1    Blood Glucose Monitoring Suppl (ONE TOUCH ULTRA 2) w/Device kit, USE TO TEST BLOOD SUGAR, Disp: 1 each, Rfl: 1    Insulin Glargine (Lantus SoloStar) 100 UNIT/ML injection pen, Inject 30 Units under the skin into the appropriate area as directed 2 (Two) Times a Day., Disp: 30 mL, Rfl: 6    [employer] Pen Needles 31G X 5 MM misc, USE ONCE DAILY, Disp: 100 each, Rfl: 0    lisinopril (PRINIVIL,ZESTRIL) 40 MG tablet, Take 1 tablet by mouth Daily., Disp: 90 tablet, Rfl: 1    glipizide (Glucotrol) 10 MG tablet, Take 1 tablet by mouth 2 (Two) Times a Day Before Meals., Disp: 180 tablet, Rfl: 1    mirtazapine (REMERON) 7.5 MG tablet, Take 1 tablet by mouth Every Night., Disp: 90 tablet, Rfl: 1    Allergies:   Allergies   Allergen Reactions    Metformin Nausea And Vomiting       Objective     Vital Signs  /90   Pulse 120   Ht 170.2 cm (67\")   Wt 91.5 kg (201 lb 12.8 oz)   SpO2 100%   BMI 31.61 kg/m²   Estimated body mass index is 31.61 kg/m² as calculated from the following:    Height as of this encounter: 170.2 cm (67\").    Weight as of this encounter: 91.5 kg (201 lb 12.8 oz).    Vital Signs were reviewed.    BMI is >= 30 and <35. (Class 1 Obesity). The following options were offered after discussion;: weight loss educational material (shared in after visit summary)      Physical Exam  Vitals and nursing note reviewed.   Constitutional:       Appearance: Normal appearance.   Cardiovascular:      Rate and Rhythm: Normal rate and regular rhythm.      Heart sounds: No murmur heard.     No friction rub. No gallop.   Pulmonary:      Effort: Pulmonary effort is normal.      Breath sounds: Normal breath sounds. No wheezing, rhonchi or rales.   Neurological:      Mental Status: He is alert.        Physical Exam         Results  Laboratory Studies  Blood sugar levels have been high, with the best recent reading being 212 and others around 500.    Procedures     Assessment and Plan     Diagnoses:    " ICD-10-CM ICD-9-CM   1. Type 2 diabetes mellitus with hyperglycemia, with long-term current use of insulin  E11.65 250.00    Z79.4 790.29     V58.67   2. Primary hypertension  I10 401.9   3. History of noncompliance with medical treatment, presenting hazards to health  Z91.199 V15.81   4. Chronic hepatitis C without hepatic coma  B18.2 070.54   5. Polysubstance abuse  F19.10 305.90   6. Other insomnia  G47.09 780.52       Plan:    1. Type 2 diabetes mellitus with hyperglycemia, with long-term current use of insulin    - CBC & Differential  - Comprehensive Metabolic Panel  - Lipid Panel  - Hemoglobin A1c  - Gamma GT  - glipizide (Glucotrol) 10 MG tablet; Take 1 tablet by mouth 2 (Two) Times a Day Before Meals.  Dispense: 180 tablet; Refill: 1  - Insulin Glargine (Lantus SoloStar) 100 UNIT/ML injection pen; Inject 30 Units under the skin into the appropriate area as directed 2 (Two) Times a Day.  Dispense: 30 mL; Refill: 6    2. Primary hypertension    - CBC & Differential  - Comprehensive Metabolic Panel  - Lipid Panel  - Hemoglobin A1c  - Gamma GT  - lisinopril (PRINIVIL,ZESTRIL) 40 MG tablet; Take 1 tablet by mouth Daily.  Dispense: 90 tablet; Refill: 1    3. History of noncompliance with medical treatment, presenting hazards to health      4. Chronic hepatitis C without hepatic coma    - CBC & Differential  - Comprehensive Metabolic Panel  - Lipid Panel  - Hemoglobin A1c  - Gamma GT  - Hepatitis C RNA, Quantitative, PCR (graph)  - Ambulatory Referral to Hepatology    5. Polysubstance abuse      6. Other insomnia    - mirtazapine (REMERON) 7.5 MG tablet; Take 1 tablet by mouth Every Night.  Dispense: 90 tablet; Refill: 1       Assessment & Plan  1. Uncontrolled diabetes mellitus.  His blood glucose levels have been consistently elevated, with recent readings around 500. He is currently on Lantus 30 units in the morning and 30 units at night. A prescription for glipizide will be added to his regimen to help lower  his blood sugar levels. He is advised to continue his current Lantus dosage. A blood work order has been issued to monitor his glucose levels. He is instructed to avoid soda and alcohol, which can drive up blood sugar levels.    2. Uncontrolled hypertension.  His current blood pressure medications will be continued. A blood work order has been issued to monitor his blood pressure.    3. Hepatitis.  He has a history of hepatitis, and it is crucial to get him treated to prevent spreading it to others. A referral to a hepatologist will be made for further evaluation and management. Liver enzymes will be checked as part of his blood work.    4. Sleep disturbances and visual hallucinations.  He reports difficulty sleeping and experiencing visual hallucinations, which may be related to stress and past meth use. A prescription for mirtazapine will be provided to be taken at bedtime to help with sleep and reduce hallucinations. He is advised to take it every night.         Follow Up  Return in about 3 months (around 3/20/2025).    Patient was advised to call the office or seek medical care if  any issues discussed during this visit worsen or persist or if new concerns arise    Patient or patient representative verbalized consent for the use of Ambient Listening during the visit with  Maddi Wilkins MD for chart documentation. 12/23/2024  12:31 JOSH Wilkins MD  [facility]  [address] [place] [id number] [phone]

## 2024-12-20 NOTE — LETTER
December 20, 2024    Dev Ascencio  [address] [place] [address] [address] [place] [address]      Dear Mr Ascencio:     Attempted to refer you to gastroenterology i.e. GI and liver specialist, on 2 separate occasions now.  In 2023 your labs show that you had active hepatitis C and I referred you there but they were unable to reach you despite trying to attempt to call you several times.  Then earlier this year we placed another referral to them due to rectal bleeding.  I strongly recommend you get these referral set back up.  Active and untreated hepatitis C puts you at risk for long-term health complications and also puts others in your life at risk from renae or spreading hepatitis through either sexual contact or drug use.  This referral was made because we now have  effective treatments available for hepatitis C so there is really no reason not to pursue treatment at this time.  Please let me know if you are willing to follow through with this.        Sincerely,        MD Maddi Silva MD

## 2024-12-23 LAB
ALBUMIN SERPL-MCNC: 3.9 G/DL (ref 3.8–4.9)
ALP SERPL-CCNC: 86 IU/L (ref 44–121)
ALT SERPL-CCNC: 24 IU/L (ref 0–44)
AST SERPL-CCNC: 21 IU/L (ref 0–40)
BASOPHILS # BLD AUTO: 0 X10E3/UL (ref 0–0.2)
BASOPHILS NFR BLD AUTO: 0 %
BILIRUB SERPL-MCNC: 0.6 MG/DL (ref 0–1.2)
BUN SERPL-MCNC: 15 MG/DL (ref 6–24)
BUN/CREAT SERPL: 15 (ref 9–20)
CALCIUM SERPL-MCNC: 9.3 MG/DL (ref 8.7–10.2)
CHLORIDE SERPL-SCNC: 101 MMOL/L (ref 96–106)
CHOLEST SERPL-MCNC: 180 MG/DL (ref 100–199)
CO2 SERPL-SCNC: 22 MMOL/L (ref 20–29)
CREAT SERPL-MCNC: 1.03 MG/DL (ref 0.76–1.27)
EGFRCR SERPLBLD CKD-EPI 2021: 86 ML/MIN/1.73
EOSINOPHIL # BLD AUTO: 0.1 X10E3/UL (ref 0–0.4)
EOSINOPHIL NFR BLD AUTO: 1 %
ERYTHROCYTE [DISTWIDTH] IN BLOOD BY AUTOMATED COUNT: 12.4 % (ref 11.6–15.4)
GGT SERPL-CCNC: 53 IU/L (ref 0–65)
GLOBULIN SER CALC-MCNC: 3.2 G/DL (ref 1.5–4.5)
GLUCOSE SERPL-MCNC: 223 MG/DL (ref 70–99)
HBA1C MFR BLD: 11 % (ref 4.8–5.6)
HCT VFR BLD AUTO: 48.5 % (ref 37.5–51)
HCV RNA SERPL NAA+PROBE-ACNC: NORMAL IU/ML
HCV RNA SERPL NAA+PROBE-LOG IU: 6.29 LOG10 IU/ML
HDLC SERPL-MCNC: 46 MG/DL
HGB BLD-MCNC: 16 G/DL (ref 13–17.7)
IMM GRANULOCYTES # BLD AUTO: 0 X10E3/UL (ref 0–0.1)
IMM GRANULOCYTES NFR BLD AUTO: 0 %
LDLC SERPL CALC-MCNC: 110 MG/DL (ref 0–99)
LYMPHOCYTES # BLD AUTO: 2.1 X10E3/UL (ref 0.7–3.1)
LYMPHOCYTES NFR BLD AUTO: 25 %
MCH RBC QN AUTO: 30.4 PG (ref 26.6–33)
MCHC RBC AUTO-ENTMCNC: 33 G/DL (ref 31.5–35.7)
MCV RBC AUTO: 92 FL (ref 79–97)
MONOCYTES # BLD AUTO: 0.4 X10E3/UL (ref 0.1–0.9)
MONOCYTES NFR BLD AUTO: 5 %
NEUTROPHILS # BLD AUTO: 5.8 X10E3/UL (ref 1.4–7)
NEUTROPHILS NFR BLD AUTO: 69 %
PLATELET # BLD AUTO: 287 X10E3/UL (ref 150–450)
POTASSIUM SERPL-SCNC: 4.7 MMOL/L (ref 3.5–5.2)
PROT SERPL-MCNC: 7.1 G/DL (ref 6–8.5)
RBC # BLD AUTO: 5.26 X10E6/UL (ref 4.14–5.8)
REF LAB TEST REF RANGE: NORMAL
SODIUM SERPL-SCNC: 140 MMOL/L (ref 134–144)
TRIGL SERPL-MCNC: 132 MG/DL (ref 0–149)
VLDLC SERPL CALC-MCNC: 24 MG/DL (ref 5–40)
WBC # BLD AUTO: 8.5 X10E3/UL (ref 3.4–10.8)

## 2024-12-26 ENCOUNTER — TELEPHONE (OUTPATIENT)
Dept: FAMILY MEDICINE CLINIC | Facility: CLINIC | Age: 54
End: 2024-12-26
Payer: MEDICARE

## 2024-12-26 NOTE — TELEPHONE ENCOUNTER
LVM for pt to call us back for results    Hub to relay:  Please let patient know his A1C and sugar are still extremely high. A little better than a few months ago but still not good enough.  I would have him bump his insulin up to 35 units to help with this.

## 2024-12-27 NOTE — TELEPHONE ENCOUNTER
LM on  to call back.     Hub to relay:  Please let patient know his A1C and sugar are still extremely high. A little better than a few months ago but still not good enough.  I would have him bump his insulin up to 35 units to help with this.

## 2025-01-24 DIAGNOSIS — Z79.4 TYPE 2 DIABETES MELLITUS WITH HYPERGLYCEMIA, WITH LONG-TERM CURRENT USE OF INSULIN: ICD-10-CM

## 2025-01-24 DIAGNOSIS — E11.65 TYPE 2 DIABETES MELLITUS WITH HYPERGLYCEMIA, WITH LONG-TERM CURRENT USE OF INSULIN: ICD-10-CM

## 2025-01-24 RX ORDER — PEN NEEDLE, DIABETIC 31 GX3/16"
NEEDLE, DISPOSABLE MISCELLANEOUS DAILY
Qty: 100 EACH | Refills: 0 | Status: SHIPPED | OUTPATIENT
Start: 2025-01-24

## 2025-03-07 DIAGNOSIS — Z79.4 TYPE 2 DIABETES MELLITUS WITH HYPERGLYCEMIA, WITH LONG-TERM CURRENT USE OF INSULIN: ICD-10-CM

## 2025-03-07 DIAGNOSIS — E11.65 TYPE 2 DIABETES MELLITUS WITH HYPERGLYCEMIA, WITH LONG-TERM CURRENT USE OF INSULIN: ICD-10-CM

## 2025-03-07 RX ORDER — PEN NEEDLE, DIABETIC 31 GX3/16"
NEEDLE, DISPOSABLE MISCELLANEOUS DAILY
Qty: 100 EACH | Refills: 0 | Status: SHIPPED | OUTPATIENT
Start: 2025-03-07